# Patient Record
Sex: FEMALE | Race: WHITE | NOT HISPANIC OR LATINO | Employment: STUDENT | URBAN - METROPOLITAN AREA
[De-identification: names, ages, dates, MRNs, and addresses within clinical notes are randomized per-mention and may not be internally consistent; named-entity substitution may affect disease eponyms.]

---

## 2024-05-15 ENCOUNTER — OFFICE VISIT (OUTPATIENT)
Dept: OBSTETRICS AND GYNECOLOGY | Facility: CLINIC | Age: 24
End: 2024-05-15
Payer: MEDICAID

## 2024-05-15 VITALS
WEIGHT: 119 LBS | BODY MASS INDEX: 21.09 KG/M2 | HEIGHT: 63 IN | DIASTOLIC BLOOD PRESSURE: 62 MMHG | SYSTOLIC BLOOD PRESSURE: 110 MMHG

## 2024-05-15 DIAGNOSIS — Z87.59 HISTORY OF PRIOR PREGNANCY WITH IUGR NEWBORN: ICD-10-CM

## 2024-05-15 DIAGNOSIS — Z82.79 FAMILY HISTORY OF DOWN SYNDROME: ICD-10-CM

## 2024-05-15 DIAGNOSIS — O21.9 NAUSEA AND VOMITING DURING PREGNANCY PRIOR TO 22 WEEKS GESTATION: ICD-10-CM

## 2024-05-15 DIAGNOSIS — N92.6 MISSED MENSES: Primary | ICD-10-CM

## 2024-05-15 PROBLEM — Z34.93 PRENATAL CARE IN THIRD TRIMESTER: Status: RESOLVED | Noted: 2022-01-24 | Resolved: 2024-05-15

## 2024-05-15 PROBLEM — Z23 NEED FOR TDAP VACCINATION: Status: RESOLVED | Noted: 2022-08-07 | Resolved: 2024-05-15

## 2024-05-15 PROBLEM — Z34.90 TERM PREGNANCY: Status: RESOLVED | Noted: 2022-08-09 | Resolved: 2024-05-15

## 2024-05-15 PROBLEM — Z34.90 PREGNANCY: Status: RESOLVED | Noted: 2022-07-04 | Resolved: 2024-05-15

## 2024-05-15 PROBLEM — O99.019 MATERNAL ANEMIA IN PREGNANCY, ANTEPARTUM: Status: RESOLVED | Noted: 2022-05-31 | Resolved: 2024-05-15

## 2024-05-15 LAB
B-HCG UR QL: POSITIVE
EXPIRATION DATE: ABNORMAL
INTERNAL NEGATIVE CONTROL: ABNORMAL
INTERNAL POSITIVE CONTROL: ABNORMAL
Lab: ABNORMAL

## 2024-05-15 RX ORDER — ONDANSETRON 4 MG/1
4 TABLET, ORALLY DISINTEGRATING ORAL EVERY 8 HOURS PRN
Qty: 30 TABLET | Refills: 2 | Status: SHIPPED | OUTPATIENT
Start: 2024-05-15
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
## 2024-06-12 ENCOUNTER — INITIAL PRENATAL (OUTPATIENT)
Dept: OBSTETRICS AND GYNECOLOGY | Facility: CLINIC | Age: 24
End: 2024-06-12

## 2024-06-12 VITALS — BODY MASS INDEX: 20.37 KG/M2 | DIASTOLIC BLOOD PRESSURE: 60 MMHG | SYSTOLIC BLOOD PRESSURE: 88 MMHG | WEIGHT: 115 LBS

## 2024-06-12 DIAGNOSIS — O26.899 HEARTBURN DURING PREGNANCY, ANTEPARTUM: ICD-10-CM

## 2024-06-12 DIAGNOSIS — O21.9 NAUSEA AND VOMITING DURING PREGNANCY PRIOR TO 22 WEEKS GESTATION: ICD-10-CM

## 2024-06-12 DIAGNOSIS — Z34.00 INITIAL OBSTETRIC VISIT, ANTEPARTUM: Primary | ICD-10-CM

## 2024-06-12 DIAGNOSIS — Z87.59 HISTORY OF PRIOR PREGNANCY WITH IUGR NEWBORN: ICD-10-CM

## 2024-06-12 DIAGNOSIS — I95.9 HYPOTENSION, UNSPECIFIED HYPOTENSION TYPE: ICD-10-CM

## 2024-06-12 DIAGNOSIS — Z36.9 ENCOUNTER FOR ANTENATAL SCREENING, UNSPECIFIED: ICD-10-CM

## 2024-06-12 DIAGNOSIS — Z82.79 FAMILY HISTORY OF DOWN SYNDROME: ICD-10-CM

## 2024-06-12 DIAGNOSIS — R12 HEARTBURN DURING PREGNANCY, ANTEPARTUM: ICD-10-CM

## 2024-06-12 LAB
BILIRUB BLD-MCNC: NEGATIVE MG/DL
CLARITY, POC: CLEAR
COLOR UR: YELLOW
GLUCOSE UR STRIP-MCNC: NEGATIVE MG/DL
KETONES UR QL: NEGATIVE
LEUKOCYTE EST, POC: NEGATIVE
NITRITE UR-MCNC: NEGATIVE MG/ML
PH UR: 5 [PH] (ref 5–8)
PROT UR STRIP-MCNC: NEGATIVE MG/DL
RBC # UR STRIP: NEGATIVE /UL
SP GR UR: 1 (ref 1–1.03)
UROBILINOGEN UR QL: NORMAL

## 2024-06-12 RX ORDER — FAMOTIDINE 20 MG/1
20 TABLET, FILM COATED ORAL 2 TIMES DAILY
Qty: 60 TABLET | Refills: 2 | Status: SHIPPED | OUTPATIENT
Start: 2024-06-12 | End: 2025-06-12

## 2024-06-12 RX ORDER — ONDANSETRON 4 MG/1
4 TABLET, ORALLY DISINTEGRATING ORAL EVERY 8 HOURS PRN
Qty: 30 TABLET | Refills: 2 | Status: SHIPPED | OUTPATIENT
Start: 2024-06-12
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
## 2024-06-13 LAB
ABO GROUP BLD: NORMAL
BASOPHILS # BLD AUTO: 0.1 X10E3/UL (ref 0–0.2)
BASOPHILS NFR BLD AUTO: 0 %
BLD GP AB SCN SERPL QL: NEGATIVE
EOSINOPHIL # BLD AUTO: 0.1 X10E3/UL (ref 0–0.4)
EOSINOPHIL NFR BLD AUTO: 1 %
ERYTHROCYTE [DISTWIDTH] IN BLOOD BY AUTOMATED COUNT: 12.8 % (ref 11.7–15.4)
HBA1C MFR BLD: 5.8 % (ref 4.8–5.6)
HBV SURFACE AG SERPL QL IA: NEGATIVE
HCT VFR BLD AUTO: 40.2 % (ref 34–46.6)
HCV IGG SERPL QL IA: NON REACTIVE
HGB A MFR BLD ELPH: 97.5 % (ref 96.4–98.8)
HGB A2 MFR BLD ELPH: 2.5 % (ref 1.8–3.2)
HGB BLD-MCNC: 13.1 G/DL (ref 11.1–15.9)
HGB F MFR BLD ELPH: 0 % (ref 0–2)
HGB FRACT BLD-IMP: NORMAL
HGB S MFR BLD ELPH: 0 %
HIV 1+2 AB+HIV1 P24 AG SERPL QL IA: NON REACTIVE
IMM GRANULOCYTES # BLD AUTO: 0.1 X10E3/UL (ref 0–0.1)
IMM GRANULOCYTES NFR BLD AUTO: 1 %
LYMPHOCYTES # BLD AUTO: 2.6 X10E3/UL (ref 0.7–3.1)
LYMPHOCYTES NFR BLD AUTO: 20 %
MCH RBC QN AUTO: 28.8 PG (ref 26.6–33)
MCHC RBC AUTO-ENTMCNC: 32.6 G/DL (ref 31.5–35.7)
MCV RBC AUTO: 88 FL (ref 79–97)
MONOCYTES # BLD AUTO: 0.6 X10E3/UL (ref 0.1–0.9)
MONOCYTES NFR BLD AUTO: 5 %
NEUTROPHILS # BLD AUTO: 9.6 X10E3/UL (ref 1.4–7)
NEUTROPHILS NFR BLD AUTO: 73 %
PLATELET # BLD AUTO: 312 X10E3/UL (ref 150–450)
RBC # BLD AUTO: 4.55 X10E6/UL (ref 3.77–5.28)
RH BLD: POSITIVE
RPR SER QL: NON REACTIVE
RUBV IGG SERPL IA-ACNC: 1.89 INDEX
VZV IGG SER IA-ACNC: 2418 INDEX
WBC # BLD AUTO: 13 X10E3/UL (ref 3.4–10.8)

## 2024-06-14 LAB
AMPHETAMINES UR QL SCN: NEGATIVE NG/ML
BACTERIA UR CULT: NO GROWTH
BACTERIA UR CULT: NORMAL
BARBITURATES UR QL SCN: NEGATIVE NG/ML
BENZODIAZ UR QL SCN: NEGATIVE NG/ML
BUPRENORPHINE UR QL: NEGATIVE NG/ML
BZE UR QL SCN: NEGATIVE NG/ML
CANNABINOIDS UR QL SCN: NEGATIVE NG/ML
CREAT UR-MCNC: 110.5 MG/DL (ref 20–300)
FENTANYL UR-MCNC: NEGATIVE PG/ML
LABORATORY COMMENT REPORT: NORMAL
MEPERIDINE UR QL: NEGATIVE NG/ML
METHADONE UR QL SCN: NEGATIVE NG/ML
OPIATES UR QL SCN: NEGATIVE NG/ML
OXYCODONE+OXYMORPHONE UR QL SCN: NEGATIVE NG/ML
PCP UR QL: NEGATIVE NG/ML
PH UR: 7.3 [PH] (ref 4.5–8.9)
PROPOXYPH UR QL SCN: NEGATIVE NG/ML
TRAMADOL UR QL SCN: NEGATIVE NG/ML

## 2024-06-15 LAB
C TRACH RRNA CVX QL NAA+PROBE: NEGATIVE
CFDNA.FET/CFDNA.TOTAL SFR FETUS: ABNORMAL %
CITATION REF LAB TEST: ABNORMAL
CONV .: NORMAL
CYTOLOGIST CVX/VAG CYTO: NORMAL
CYTOLOGY CVX/VAG DOC CYTO: NORMAL
CYTOLOGY CVX/VAG DOC THIN PREP: NORMAL
DX ICD CODE: NORMAL
FET 13+18+21+X+Y ANEUP PLAS.CFDNA: ABNORMAL
GA EST FROM CONCEPTION DATE: ABNORMAL D
GESTATIONAL AGE > 9:: YES
LAB DIRECTOR NAME PROVIDER: ABNORMAL
LAB DIRECTOR NAME PROVIDER: ABNORMAL
LABORATORY COMMENT REPORT: ABNORMAL
LIMITATIONS OF THE TEST: ABNORMAL
Lab: NORMAL
N GONORRHOEA RRNA CVX QL NAA+PROBE: NEGATIVE
NEGATIVE PREDICTIVE VALUE: ABNORMAL
NOTE: ABNORMAL
OTHER STN SPEC: NORMAL
PERFORMANCE CHARACTERISTICS: ABNORMAL
REF LAB TEST METHOD: ABNORMAL
STAT OF ADQ CVX/VAG CYTO-IMP: NORMAL
T VAGINALIS RRNA SPEC QL NAA+PROBE: NEGATIVE
TEST PERFORMANCE INFO SPEC: ABNORMAL

## 2024-06-26 ENCOUNTER — LAB (OUTPATIENT)
Dept: OBSTETRICS AND GYNECOLOGY | Facility: CLINIC | Age: 24
End: 2024-06-26
Payer: MEDICAID

## 2024-06-26 ENCOUNTER — ROUTINE PRENATAL (OUTPATIENT)
Dept: OBSTETRICS AND GYNECOLOGY | Facility: CLINIC | Age: 24
End: 2024-06-26
Payer: MEDICAID

## 2024-06-26 VITALS — WEIGHT: 123.6 LBS | SYSTOLIC BLOOD PRESSURE: 110 MMHG | DIASTOLIC BLOOD PRESSURE: 64 MMHG | BODY MASS INDEX: 21.89 KG/M2

## 2024-06-26 DIAGNOSIS — O20.8 SUBCHORIONIC HEMORRHAGE IN FIRST TRIMESTER: ICD-10-CM

## 2024-06-26 DIAGNOSIS — Z36.9 ENCOUNTER FOR ANTENATAL SCREENING, UNSPECIFIED: Primary | ICD-10-CM

## 2024-06-26 DIAGNOSIS — Z34.92 PRENATAL CARE IN SECOND TRIMESTER: ICD-10-CM

## 2024-06-26 DIAGNOSIS — Z82.79 FAMILY HISTORY OF DOWN SYNDROME: ICD-10-CM

## 2024-06-26 DIAGNOSIS — Z67.21 TYPE B BLOOD, RH NEGATIVE: Primary | ICD-10-CM

## 2024-06-26 DIAGNOSIS — R73.09 ELEVATED HEMOGLOBIN A1C: ICD-10-CM

## 2024-06-26 DIAGNOSIS — Z36.9 ENCOUNTER FOR ANTENATAL SCREENING, UNSPECIFIED: ICD-10-CM

## 2024-06-26 LAB
BILIRUB BLD-MCNC: NEGATIVE MG/DL
CLARITY, POC: CLEAR
COLOR UR: YELLOW
EXTERNAL NIPT: NEGATIVE
GLUCOSE UR STRIP-MCNC: NEGATIVE MG/DL
KETONES UR QL: NEGATIVE
LEUKOCYTE EST, POC: NEGATIVE
NITRITE UR-MCNC: NEGATIVE MG/ML
PH UR: 5 [PH] (ref 5–8)
PROT UR STRIP-MCNC: NEGATIVE MG/DL
RBC # UR STRIP: NEGATIVE /UL
SP GR UR: 1 (ref 1–1.03)
UROBILINOGEN UR QL: NORMAL

## 2024-06-26 NOTE — PROGRESS NOTES
OB follow up     CC:  Here for prenatal follow up    Shun Tafoya is a 23 y.o.  12w2d being seen today for her obstetrical visit.  Patient reports  no c/o. Her nausea is improved with Zofran.   Taking +PNV.     Review of Systems  Genitourinary: Neg for cramping, vaginal bleeding, SROM, or dysuria.       /64   Wt 56.1 kg (123 lb 9.6 oz)   LMP 2024   BMI 21.89 kg/m²     FHT: 150s  BPM   Uterine Size: size equals dates   Assessment    1) Pregnancy at 12w2d     2) CF/SMA/FX neg with previous pregnancy. T21 cancelled by lab, repeat draw today.     3) Abnormal HgbA1C- Early 2hr GTT in progress today.     4)  N/V- Taking zofran      5) heartburn- Taking Pepcid BID     10) Hx of SGA in 1st pregnancy- Serial growth US starting at 28 weeks      11) Family hx of Down's- maternal cousin; repeating NIPT today     12) hypotension- BP normal today. Feels better.      13) CHRISTINE- Rh +. Denies VB. Repeat US in 2 weeks      Plan    Continue prenatal vitamins   Reviewed this stage of pregnancy  Problem list updated   Follow up in 2 weeks for US to reeval CHRISTINE    Ashlee Jones, KRISTA     2024  10:55 EDT

## 2024-06-27 LAB
GLUCOSE 1H P 75 G GLC PO SERPL-MCNC: 91 MG/DL (ref 70–179)
GLUCOSE 2H P 75 G GLC PO SERPL-MCNC: 98 MG/DL (ref 70–152)
GLUCOSE P FAST SERPL-MCNC: 84 MG/DL (ref 70–91)
HCT VFR BLD AUTO: 36.3 % (ref 34–46.6)
HGB BLD-MCNC: 12 G/DL (ref 11.1–15.9)
RPR SER QL: NON REACTIVE

## 2024-07-01 LAB
CFDNA.FET/CFDNA.TOTAL SFR FETUS: NORMAL %
CITATION REF LAB TEST: NORMAL
FET 13+18+21+X+Y ANEUP PLAS.CFDNA: NEGATIVE
FET CHR 21 TS PLAS.CFDNA QL: NEGATIVE
FET SEX PLAS.CFDNA DOSAGE CFDNA: NORMAL
FET TS 13 RISK PLAS.CFDNA QL: NEGATIVE
FET TS 18 RISK WBC.DNA+CFDNA QL: NEGATIVE
GA EST FROM CONCEPTION DATE: NORMAL D
GESTATIONAL AGE > 9:: YES
LAB DIRECTOR NAME PROVIDER: NORMAL
LAB DIRECTOR NAME PROVIDER: NORMAL
LABORATORY COMMENT REPORT: NORMAL
LIMITATIONS OF THE TEST: NORMAL
NEGATIVE PREDICTIVE VALUE: NORMAL
NOTE: NORMAL
PERFORMANCE CHARACTERISTICS: NORMAL
POSITIVE PREDICTIVE VALUE: NORMAL
REF LAB TEST METHOD: NORMAL
TEST PERFORMANCE INFO SPEC: NORMAL

## 2024-07-10 ENCOUNTER — ROUTINE PRENATAL (OUTPATIENT)
Dept: OBSTETRICS AND GYNECOLOGY | Facility: CLINIC | Age: 24
End: 2024-07-10

## 2024-07-10 VITALS — SYSTOLIC BLOOD PRESSURE: 102 MMHG | DIASTOLIC BLOOD PRESSURE: 60 MMHG | WEIGHT: 122 LBS | BODY MASS INDEX: 21.61 KG/M2

## 2024-07-10 DIAGNOSIS — Z36.9 ENCOUNTER FOR ANTENATAL SCREENING, UNSPECIFIED: Primary | ICD-10-CM

## 2024-07-10 NOTE — PROGRESS NOTES
S:  CC: Pt here for ob office visit and f/u u/s for CHRISTINE.  HPI: Shun Tafoya is a 23 y.o.  14w2d being seen today for her obstetrical visit.   Patient reports no complaints .   Fetal movement: too early. .      Social History     Social History Narrative    Not on file      SMOKER? No      O:  /60   Wt 55.3 kg (122 lb)   LMP 2024   BMI 21.61 kg/m²     Prenatal Assessment  Fetal Heart Rate: present  Movement: Absent  Prenatal Vitals  BP: 102/60  Weight: 55.3 kg (122 lb)  Vaginal Drainage  Draining Fluid: No  Edema  LLE Edema: None  RLE Edema: None  Facial Edema: None    Brief Urine Lab Results  (Last result in the past 365 days)        Color   Clarity   Blood   Leuk Est   Nitrite   Protein   CREAT   Urine HCG        24 1109 Yellow   Clear   Negative   Negative   Negative   Negative                   Results for orders placed in visit on 07/10/24     Ob Limited 1 + Fetuses    Narrative  Normal, VIUP, no CHRISTINE       A/P:    Assessment & Plan  Encounter for  screening, unspecified      Orders Placed This Encounter   Procedures    POC Urinalysis Dipstick            Return in about 4 weeks (around 2024) for ob tummy.      Pt instructed to call for results of any testing done today and that failure to call if she has not heard from us could result in inadequate care and treament.  Pt verbalized her understanding.        Henrry Claros MD  10:09 EDT   07/10/24

## 2024-08-07 ENCOUNTER — ROUTINE PRENATAL (OUTPATIENT)
Dept: OBSTETRICS AND GYNECOLOGY | Facility: CLINIC | Age: 24
End: 2024-08-07
Payer: MEDICAID

## 2024-08-07 VITALS — SYSTOLIC BLOOD PRESSURE: 98 MMHG | WEIGHT: 127 LBS | BODY MASS INDEX: 22.5 KG/M2 | DIASTOLIC BLOOD PRESSURE: 62 MMHG

## 2024-08-07 DIAGNOSIS — I95.89 OTHER SPECIFIED HYPOTENSION: ICD-10-CM

## 2024-08-07 DIAGNOSIS — Z82.79 FAMILY HISTORY OF DOWN SYNDROME: ICD-10-CM

## 2024-08-07 DIAGNOSIS — Z87.59 HISTORY OF PRIOR PREGNANCY WITH IUGR NEWBORN: ICD-10-CM

## 2024-08-07 DIAGNOSIS — Z36.9 ENCOUNTER FOR ANTENATAL SCREENING, UNSPECIFIED: ICD-10-CM

## 2024-08-07 DIAGNOSIS — Z34.92 PRENATAL CARE IN SECOND TRIMESTER: Primary | ICD-10-CM

## 2024-08-07 LAB
BILIRUB BLD-MCNC: NEGATIVE MG/DL
CLARITY, POC: CLEAR
COLOR UR: YELLOW
GLUCOSE UR STRIP-MCNC: NEGATIVE MG/DL
KETONES UR QL: NEGATIVE
LEUKOCYTE EST, POC: NEGATIVE
NITRITE UR-MCNC: NEGATIVE MG/ML
PH UR: 5 [PH] (ref 5–8)
PROT UR STRIP-MCNC: NEGATIVE MG/DL
RBC # UR STRIP: NEGATIVE /UL
SP GR UR: 1.02 (ref 1–1.03)
UROBILINOGEN UR QL: NORMAL

## 2024-08-07 NOTE — PROGRESS NOTES
OB follow up < 20 weeks    CC:  Here for prenatal follow up    Shun Tafoya is a 23 y.o.  18w2d being seen today for her obstetrical visit.  Patient reports  headaches.   Taking +PNV.  FM+.    Review of Systems  Genitourinary: Neg for cramping, vaginal bleeding, SROM, or dysuria.   BP 98/62   Wt 57.6 kg (127 lb)   LMP 2024   BMI 22.50 kg/m²     FHT: 146  BPM   Uterine Size: size equals dates   Assessment    1) Pregnancy at 18w2d     2) CF/SMA/FX neg with previous pregnancy. T21 neg. Check AFP today.    3) Abnormal HgbA1C- Early 2hr GTT passed; repeat at routine 28wga    4) N/V- Taking zofran      5) heartburn- Taking Pepcid BID     10) Hx of SGA in 1st pregnancy- Serial growth US starting at 28 weeks      11) Family hx of Down's- maternal cousin; NIPT neg    12) hypotension- slightly low today. Reports headaches; rec OTC fluid with electrolytes     13) CHRISTINE- resolved        Plan    Continue prenatal vitamins   Reviewed this stage of pregnancy  Problem list updated   Follow up in 2 weeks for OBT, US for anatomy    Parts of this document have been copied or forwarded from her previous visits and have been reviewed, updated and edited as indicated.      Chanel Pedraza, APRN     2024  12:19 EDT

## 2024-08-09 LAB
AFP INTERP SERPL-IMP: NORMAL
AFP INTERP SERPL-IMP: NORMAL
AFP MOM SERPL: 1.68
AFP SERPL-MCNC: 94 NG/ML
AGE AT DELIVERY: 23.1 YR
GA METHOD: NORMAL
GA: 18.2 WEEKS
IDDM PATIENT QL: NO
LABORATORY COMMENT REPORT: NORMAL
MULTIPLE PREGNANCY: NO
NEURAL TUBE DEFECT RISK FETUS: 3427 %
RESULT: NORMAL

## 2024-08-21 ENCOUNTER — ROUTINE PRENATAL (OUTPATIENT)
Dept: OBSTETRICS AND GYNECOLOGY | Facility: CLINIC | Age: 24
End: 2024-08-21
Payer: MEDICAID

## 2024-08-21 VITALS — WEIGHT: 130.8 LBS | DIASTOLIC BLOOD PRESSURE: 64 MMHG | SYSTOLIC BLOOD PRESSURE: 110 MMHG | BODY MASS INDEX: 23.17 KG/M2

## 2024-08-21 DIAGNOSIS — Z34.92 PRENATAL CARE IN SECOND TRIMESTER: Primary | ICD-10-CM

## 2024-08-21 DIAGNOSIS — O20.8 SUBCHORIONIC HEMORRHAGE IN FIRST TRIMESTER: ICD-10-CM

## 2024-08-21 DIAGNOSIS — I95.89 OTHER SPECIFIED HYPOTENSION: ICD-10-CM

## 2024-08-21 DIAGNOSIS — R12 HEARTBURN DURING PREGNANCY, ANTEPARTUM: ICD-10-CM

## 2024-08-21 DIAGNOSIS — O44.02 COMPLETE PLACENTA PREVIA NOS OR WITHOUT HEMORRHAGE, SECOND TRIMESTER: ICD-10-CM

## 2024-08-21 DIAGNOSIS — Z36.9 ENCOUNTER FOR ANTENATAL SCREENING, UNSPECIFIED: ICD-10-CM

## 2024-08-21 DIAGNOSIS — R73.09 ELEVATED HEMOGLOBIN A1C: ICD-10-CM

## 2024-08-21 DIAGNOSIS — Z87.59 HISTORY OF PRIOR PREGNANCY WITH IUGR NEWBORN: ICD-10-CM

## 2024-08-21 DIAGNOSIS — Z82.79 FAMILY HISTORY OF DOWN SYNDROME: ICD-10-CM

## 2024-08-21 DIAGNOSIS — O26.899 HEARTBURN DURING PREGNANCY, ANTEPARTUM: ICD-10-CM

## 2024-08-21 PROBLEM — O44.12 COMPLETE PLACENTA PREVIA WITH HEMORRHAGE, SECOND TRIMESTER: Status: ACTIVE | Noted: 2024-08-21

## 2024-08-21 NOTE — PROGRESS NOTES
Chief Complaint   Patient presents with    Routine Prenatal Visit       HPI: 23 y.o.  at 20w2d here for routine OB visit.  Anatomy scan today.  Feeling much better.  FM+.  Here with significant other.  Denies VB.    Relevant data reviewed: US IMP- fetal anatomy complete and appears WNL. VTX. FHR 147bpm. Post placenta. Cx khadijah 4.67cm. MVP 3.86cm. Complete previa.     Last OB US growth (since 4/3/2024)       None          Vitals:    24 1029   BP: 110/64   Weight: 59.3 kg (130 lb 12.8 oz)     Total weight gain for pregnancy:  Not found.    Cx exam:   / /        Review of systems:   Gen: negative  CV:     negative  GI: negative  :   negative and good fetal movement noted   MS:    negative  Neuro: negative  Pul: negative    Physical Exam  Vitals reviewed.   Constitutional:       General: She is not in acute distress.  HENT:      Head: Normocephalic and atraumatic.   Pulmonary:      Effort: Pulmonary effort is normal. No respiratory distress.   Musculoskeletal:      Cervical back: Neck supple. No rigidity.   Neurological:      Mental Status: She is alert and oriented to person, place, and time.         A/P  1. Intrauterine pregnancy at 20w2d   2. Pregnancy Risk:  COMPLICATED- complete previa    Diagnoses and all orders for this visit:    1. Prenatal care in second trimester (Primary)    2. Encounter for  screening, unspecified  Overview:  CF/SMA/FX neg with previous pregnancy. T21 neg. AFP neg.     Orders:  -     POC Urinalysis Dipstick    3. Elevated hemoglobin A1c  Overview:  Early 2hr GTT passed; repeat at routine 28wga       4. Family history of Down syndrome- maternal cousin  Overview:  NIPT neg       5. History of prior pregnancy with SGA: Growth 16%, AC< 2% @ 39 weeks   Overview:  Serial growth US starting at 28 weeks       6. Other specified hypotension  Overview:  BP normotensive; feels much better today      7. Subchorionic hemorrhage in first trimester- resolved    8. Complete placenta  previa nos or without hemorrhage, second trimester  Overview:  Noted on US today.  Denies VB. Discussed pelvic restrictions.  Repeat US at 24wga      9. Heartburn during pregnancy, antepartum  Overview:  Taking Pepcid BID            labor was discussed.  Warnings were provided.  Nutrition and weight gain were addressed.  Practice OB call structure was discussed.   Reviewed this stage of pregnancy  Problem list updated  -----------------------  PLAN:   Return in about 4 weeks (around 2024) for OB Tummy, repeat US- complete previa.    Chanel Pedraza, APRN  2024 10:57 EDT

## 2024-09-07 DIAGNOSIS — O26.899 HEARTBURN DURING PREGNANCY, ANTEPARTUM: ICD-10-CM

## 2024-09-07 DIAGNOSIS — R12 HEARTBURN DURING PREGNANCY, ANTEPARTUM: ICD-10-CM

## 2024-09-13 RX ORDER — FAMOTIDINE 20 MG/1
20 TABLET, FILM COATED ORAL 2 TIMES DAILY
Qty: 180 TABLET | Refills: 0 | Status: SHIPPED | OUTPATIENT
Start: 2024-09-13

## 2024-09-18 ENCOUNTER — ROUTINE PRENATAL (OUTPATIENT)
Dept: OBSTETRICS AND GYNECOLOGY | Facility: CLINIC | Age: 24
End: 2024-09-18
Payer: MEDICAID

## 2024-09-18 VITALS — SYSTOLIC BLOOD PRESSURE: 118 MMHG | BODY MASS INDEX: 24.09 KG/M2 | WEIGHT: 136 LBS | DIASTOLIC BLOOD PRESSURE: 74 MMHG

## 2024-09-18 DIAGNOSIS — Z36.9 ENCOUNTER FOR ANTENATAL SCREENING, UNSPECIFIED: ICD-10-CM

## 2024-09-18 DIAGNOSIS — Z82.79 FAMILY HISTORY OF DOWN SYNDROME: ICD-10-CM

## 2024-09-18 DIAGNOSIS — Z34.92 PRENATAL CARE IN SECOND TRIMESTER: Primary | ICD-10-CM

## 2024-09-18 DIAGNOSIS — O44.12 COMPLETE PLACENTA PREVIA WITH HEMORRHAGE, SECOND TRIMESTER: ICD-10-CM

## 2024-10-07 ENCOUNTER — ROUTINE PRENATAL (OUTPATIENT)
Dept: OBSTETRICS AND GYNECOLOGY | Facility: CLINIC | Age: 24
End: 2024-10-07
Payer: MEDICAID

## 2024-10-07 VITALS — SYSTOLIC BLOOD PRESSURE: 102 MMHG | DIASTOLIC BLOOD PRESSURE: 60 MMHG | BODY MASS INDEX: 25.97 KG/M2 | WEIGHT: 142 LBS

## 2024-10-07 DIAGNOSIS — R12 HEARTBURN DURING PREGNANCY, ANTEPARTUM: ICD-10-CM

## 2024-10-07 DIAGNOSIS — Z87.59 HISTORY OF PRIOR PREGNANCY WITH IUGR NEWBORN: ICD-10-CM

## 2024-10-07 DIAGNOSIS — O26.899 HEARTBURN DURING PREGNANCY, ANTEPARTUM: ICD-10-CM

## 2024-10-07 DIAGNOSIS — Z34.93 PRENATAL CARE IN THIRD TRIMESTER: Primary | ICD-10-CM

## 2024-10-07 DIAGNOSIS — R73.09 ELEVATED HEMOGLOBIN A1C: ICD-10-CM

## 2024-10-07 DIAGNOSIS — Z82.79 FAMILY HISTORY OF DOWN SYNDROME: ICD-10-CM

## 2024-10-07 DIAGNOSIS — Z36.9 ENCOUNTER FOR ANTENATAL SCREENING, UNSPECIFIED: ICD-10-CM

## 2024-10-07 DIAGNOSIS — O44.12 COMPLETE PLACENTA PREVIA WITH HEMORRHAGE, SECOND TRIMESTER: ICD-10-CM

## 2024-10-07 PROCEDURE — 99213 OFFICE O/P EST LOW 20 MIN: CPT | Performed by: NURSE PRACTITIONER

## 2024-10-07 PROCEDURE — 90472 IMMUNIZATION ADMIN EACH ADD: CPT | Performed by: NURSE PRACTITIONER

## 2024-10-07 PROCEDURE — 90471 IMMUNIZATION ADMIN: CPT | Performed by: NURSE PRACTITIONER

## 2024-10-07 PROCEDURE — 90715 TDAP VACCINE 7 YRS/> IM: CPT | Performed by: NURSE PRACTITIONER

## 2024-10-07 PROCEDURE — 90656 IIV3 VACC NO PRSV 0.5 ML IM: CPT | Performed by: NURSE PRACTITIONER

## 2024-10-07 NOTE — PROGRESS NOTES
OB follow up > 20 weeks    Chief Complaint   Patient presents with    Routine Prenatal Visit       Shun Tafoya is a 23 y.o.  27w0d being seen today for her obstetrical visit.  Patient reports no complaints. Taking prenatal vitamins: Yes      Review of Systems  Genitourinary: Negative for contractions, cramping, vaginal bleeding, or SROM.   Fetal movement: normal  No Known Allergies     /60   Wt 64.4 kg (142 lb)   LMP 2024   BMI 25.97 kg/m²     FHT: 130 BPM   Uterine Size: 26 cm       Assessment    1) pregnancy at 27w0d     2) CF/SMA/FX neg with previous pregnancy. T21 neg.  AFP neg     3) Abnormal HgbA1C- Early 2hr GTT passed; repeat at routine 28wga     4) N/V- Taking zofran PRN     5) heartburn- Taking Pepcid BID     6) Hx of SGA in 1st pregnancy- Serial growth US starting at 28 weeks    24wga (57%), 28wga (), 32wga (), 36wga ()    7) Family hx of Down's- maternal cousin; NIPT neg     8) Complete previa- Resolved. Restrictions lifted.      9) RSV vaccine info provided      10) Disc that all pregnant women should get a Tdap shot in the third trimester, preferably between 27 weeks and 36 weeks of pregnancy. The Tdap shot is an effective and safe way to protect the baby from serious illness and complications of pertussis. Recommend that partners, family members, and infant caregivers should be up to date on theTdap vaccine if they have not previously been vaccinated. Ideally, all family members should be vaccinated at least 2 weeks before coming in contact with the . If not administered during pregnancy, the Tdap vaccine should be given immediately postpartum if the patient is not UTD on Tdap.   Received Tdap vaccine today.    11) Received flu vaccine    Plan    Continue prenatal vitamins  Reviewed this stage of pregnancy  Problem list updated   Follow up in 2 weeks for OBT, US-growth    Parts of this document have been copied or forwarded from her previous visits and have been  reviewed, updated and edited as indicated.      Chanel Pedraza, APRN  10/7/2024  10:31 EDT

## 2024-10-08 DIAGNOSIS — O99.019 MATERNAL ANEMIA IN PREGNANCY, ANTEPARTUM: Primary | ICD-10-CM

## 2024-10-08 RX ORDER — FERROUS GLUCONATE 324(38)MG
324 TABLET ORAL
Qty: 100 TABLET | Refills: 2 | Status: SHIPPED | OUTPATIENT
Start: 2024-10-08

## 2024-10-22 ENCOUNTER — ROUTINE PRENATAL (OUTPATIENT)
Dept: OBSTETRICS AND GYNECOLOGY | Facility: CLINIC | Age: 24
End: 2024-10-22
Payer: MEDICAID

## 2024-10-22 VITALS — SYSTOLIC BLOOD PRESSURE: 108 MMHG | DIASTOLIC BLOOD PRESSURE: 64 MMHG | WEIGHT: 143 LBS | BODY MASS INDEX: 26.16 KG/M2

## 2024-10-22 DIAGNOSIS — Z36.9 ENCOUNTER FOR ANTENATAL SCREENING, UNSPECIFIED: Primary | ICD-10-CM

## 2024-10-22 NOTE — PROGRESS NOTES
OB follow up > 20 weeks    Chief Complaint   Patient presents with    Routine Prenatal Visit       Shun Tafoya is a 23 y.o.  29+ being seen today for her obstetrical visit.  Patient reports some LBP. Taking prenatal vitamins: Yes      Review of Systems  Genitourinary: Negative for contractions, cramping, vaginal bleeding, or SROM.   Fetal movement: normal  No Known Allergies     /64   Wt 64.9 kg (143 lb)   LMP 2024   BMI 26.16 kg/m²     Vitals: VSS; AF    General Appearance:  Awake. Alert. Well developed. Well nourished. In no acute distress.    Visual Inspection: ° Abdomen was normal on visual inspection.  Palpation: ° Abdomen was soft. ° Abdominal non-tender.    Uterus: ° Fundal height was normal for gestational age. ° Not tender.  Uterine Adnexae: ° Normal without masses or tenderness.  Neurological:  ° Oriented to time, place, and person.  Skin:  ° General appearance was normal. No bruising or ecchymosis.  Obstetrical: +FM and FCA     Presentation: VTX  Placenta: Posterior  HAYDEN: 14 cm   FCA: 130's    EFW  1441g 3.3# 60%         Ultrasound images and report reviewed personally by me.    Full interpretation of ultrasound can be found in the patient's note on the same date of service.     Raúl Poe, DO     Assessment    1) pregnancy at 29+1  2hr neg   Anemia noted on Fe+     2) CF/SMA/FX neg with previous pregnancy. T21 neg.  AFP neg     3) Abnormal HgbA1C- Early 2hr GTT passed; repeat at routine 28wga     4) N/V- Taking zofran PRN     5) heartburn- Taking Pepcid BID     6) Hx of SGA in 1st pregnancy- Serial growth US starting at 28 weeks    24wga (57%), 28wga (60%),   Growth @ 32wga (), 36wga ()    7) Family hx of Down's- maternal cousin; NIPT neg     8) Complete previa- Resolved. Restrictions lifted.      9) RSV vaccine info provided      10)   Received Tdap and flu vaccine     11) Anemia noted  Re-check 32 wga ( )     Plan    Continue prenatal vitamins  Reviewed this stage of  pregnancy  Problem list updated   Follow up in 3 weeks for OB, CBC, RSV vaccine     Parts of this document have been copied or forwarded from her previous visits and have been reviewed, updated and edited as indicated.      Raúl Poe DO  10/22/2024  11:31 EDT

## 2024-11-05 ENCOUNTER — ROUTINE PRENATAL (OUTPATIENT)
Dept: OBSTETRICS AND GYNECOLOGY | Facility: CLINIC | Age: 24
End: 2024-11-05
Payer: MEDICAID

## 2024-11-05 VITALS — DIASTOLIC BLOOD PRESSURE: 70 MMHG | BODY MASS INDEX: 26.52 KG/M2 | WEIGHT: 145 LBS | SYSTOLIC BLOOD PRESSURE: 102 MMHG

## 2024-11-05 DIAGNOSIS — Z87.59 HISTORY OF PRIOR PREGNANCY WITH IUGR NEWBORN: ICD-10-CM

## 2024-11-05 DIAGNOSIS — O26.893 HEARTBURN DURING PREGNANCY IN THIRD TRIMESTER: ICD-10-CM

## 2024-11-05 DIAGNOSIS — Z36.9 ENCOUNTER FOR ANTENATAL SCREENING, UNSPECIFIED: ICD-10-CM

## 2024-11-05 DIAGNOSIS — R12 HEARTBURN DURING PREGNANCY IN THIRD TRIMESTER: ICD-10-CM

## 2024-11-05 DIAGNOSIS — Z34.93 PRENATAL CARE IN THIRD TRIMESTER: Primary | ICD-10-CM

## 2024-11-05 NOTE — PROGRESS NOTES
OB follow up     Shun Tafoya is a 23 y.o.  31w1d being seen today for her obstetrical visit.  Patient reports no bleeding, no contractions, and no leaking. Fetal movement: normal.  Prenatal care complicated by GERD.  Takes Pepcid 20 mg twice daily with good relief.  Symptoms are stable.  Had a history of an SGA baby.  Last ultrasound 2 weeks ago showed growth in the 60th percentile.    Review of Systems  No bleeding, No cramping/contractions     /70   Wt 65.8 kg (145 lb)   LMP 2024   BMI 26.52 kg/m²     FHT:   BPM   Uterine Size:         Assessment/Plan:    1) 23 y.o.  -pregnancy at 31w1d    2)   Encounter Diagnoses   Name Primary?    Prenatal care in third trimester Yes    Encounter for  screening, unspecified     Heartburn during pregnancy in third trimester     History of prior pregnancy with SGA: Growth 16%, AC< 2% @ 39 weeks     Repeat ultrasound every 4 weeks.  Ultrasound at next visit for growth due to history of SGA and a previous pregnancy.  Continue Pepcid daily.    3) Reviewed this stage of pregnancy  4) Problem list updated     Return in about 2 weeks (around 2024) for US, Growth (Hx SGA), OB Tummy.    I spent 20 minutes caring for Shun on this date of service. This time includes time spent by me in the following activities: preparing for the visit, reviewing tests, performing a medically appropriate examination and/or evaluation, counseling and educating the patient/family/caregiver, documenting information in the medical record, and ordering test(s).      Xavier Jean MD    2024  16:24 EST

## 2024-11-18 ENCOUNTER — ROUTINE PRENATAL (OUTPATIENT)
Dept: OBSTETRICS AND GYNECOLOGY | Facility: CLINIC | Age: 24
End: 2024-11-18
Payer: MEDICAID

## 2024-11-18 VITALS — SYSTOLIC BLOOD PRESSURE: 110 MMHG | BODY MASS INDEX: 26.89 KG/M2 | DIASTOLIC BLOOD PRESSURE: 62 MMHG | WEIGHT: 147 LBS

## 2024-11-18 DIAGNOSIS — Z36.9 ENCOUNTER FOR ANTENATAL SCREENING, UNSPECIFIED: ICD-10-CM

## 2024-11-18 DIAGNOSIS — D50.9 IRON DEFICIENCY ANEMIA, UNSPECIFIED IRON DEFICIENCY ANEMIA TYPE: ICD-10-CM

## 2024-11-18 DIAGNOSIS — Z34.93 PRENATAL CARE IN THIRD TRIMESTER: Primary | ICD-10-CM

## 2024-11-18 LAB
BILIRUB BLD-MCNC: NEGATIVE MG/DL
CLARITY, POC: CLEAR
COLOR UR: YELLOW
DEPRECATED FRAXE GENE CGG RPT BLD/T QL: NORMAL
GLUCOSE UR STRIP-MCNC: NEGATIVE MG/DL
KETONES UR QL: NEGATIVE
LEUKOCYTE EST, POC: NEGATIVE
NITRITE UR-MCNC: NEGATIVE MG/ML
NTRA CYSTIC FIBROSIS: NORMAL
NTRA SPINAL MUSCULAR ATROPHY: NORMAL
PH UR: 5 [PH] (ref 5–8)
PROT UR STRIP-MCNC: NEGATIVE MG/DL
RBC # UR STRIP: NEGATIVE /UL
SP GR UR: 1 (ref 1–1.03)
UROBILINOGEN UR QL: NORMAL

## 2024-11-18 NOTE — PROGRESS NOTES
OB follow up > 20 weeks    Chief Complaint   Patient presents with    Routine Prenatal Visit       Shun Tafoya is a 24 y.o.  33w0d being seen today for her obstetrical visit.  Patient reports no complaints. Taking prenatal vitamins: Yes. She had a growth US today.       Review of Systems  Genitourinary: Negative for contractions, cramping, vaginal bleeding, or SROM.   Fetal movement: normal  No Known Allergies     /62   Wt 66.7 kg (147 lb)   LMP 2024   BMI 26.89 kg/m²     FHT: 140s  BPM   Uterine Size: Growth 52%        Assessment    1) pregnancy at 33w0d- US today shows Growth 52%. HAYDEN 12cm.  bpm.     2) CF/SMA/FX neg with previous pregnancy. T21 neg.  AFP neg     3) Abnormal HgbA1C- Early 2hr GTT and 28 week screening passed;      4) N/V- Taking zofran PRN     5) heartburn- Taking Pepcid BID     6) Hx of SGA in 1st pregnancy- Serial growth US starting at 28 weeks    24wga (57%), 28wga (60%),   Growth @ 32wga (), 36wga ()     7) Family hx of Down's- maternal cousin; NIPT neg     8) Complete previa- Resolved. Restrictions lifted.      9)   Vaccines:   S/P tDap   S/P Flu   RSV given today.      11) Anemia noted- Taking iron.   Re-check 32 wga ( )     Plan    Continue prenatal vitamins  Reviewed this stage of pregnancy  Problem list updated   Follow up in 2 weeks    Ashlee Jones, KRISTA  2024  15:44 EST  
RSV  
rollator/independent/needs device

## 2024-11-19 LAB
BASOPHILS # BLD AUTO: 0.05 10*3/MM3 (ref 0–0.2)
BASOPHILS NFR BLD AUTO: 0.3 % (ref 0–1.5)
EOSINOPHIL # BLD AUTO: 0.27 10*3/MM3 (ref 0–0.4)
EOSINOPHIL NFR BLD AUTO: 1.5 % (ref 0.3–6.2)
ERYTHROCYTE [DISTWIDTH] IN BLOOD BY AUTOMATED COUNT: 13.1 % (ref 12.3–15.4)
HCT VFR BLD AUTO: 32.3 % (ref 34–46.6)
HGB BLD-MCNC: 11 G/DL (ref 12–15.9)
IMM GRANULOCYTES # BLD AUTO: 0.2 10*3/MM3 (ref 0–0.05)
IMM GRANULOCYTES NFR BLD AUTO: 1.1 % (ref 0–0.5)
LYMPHOCYTES # BLD AUTO: 2.75 10*3/MM3 (ref 0.7–3.1)
LYMPHOCYTES NFR BLD AUTO: 15.5 % (ref 19.6–45.3)
MCH RBC QN AUTO: 29.5 PG (ref 26.6–33)
MCHC RBC AUTO-ENTMCNC: 34.1 G/DL (ref 31.5–35.7)
MCV RBC AUTO: 86.6 FL (ref 79–97)
MONOCYTES # BLD AUTO: 1.44 10*3/MM3 (ref 0.1–0.9)
MONOCYTES NFR BLD AUTO: 8.1 % (ref 5–12)
NEUTROPHILS # BLD AUTO: 13.04 10*3/MM3 (ref 1.7–7)
NEUTROPHILS NFR BLD AUTO: 73.5 % (ref 42.7–76)
NRBC BLD AUTO-RTO: 0 /100 WBC (ref 0–0.2)
PLATELET # BLD AUTO: 285 10*3/MM3 (ref 140–450)
RBC # BLD AUTO: 3.73 10*6/MM3 (ref 3.77–5.28)
WBC # BLD AUTO: 17.75 10*3/MM3 (ref 3.4–10.8)

## 2024-12-06 ENCOUNTER — ROUTINE PRENATAL (OUTPATIENT)
Dept: OBSTETRICS AND GYNECOLOGY | Facility: CLINIC | Age: 24
End: 2024-12-06
Payer: MEDICAID

## 2024-12-06 VITALS — BODY MASS INDEX: 27.8 KG/M2 | WEIGHT: 152 LBS | SYSTOLIC BLOOD PRESSURE: 108 MMHG | DIASTOLIC BLOOD PRESSURE: 68 MMHG

## 2024-12-06 DIAGNOSIS — Z36.9 ENCOUNTER FOR ANTENATAL SCREENING, UNSPECIFIED: Primary | ICD-10-CM

## 2024-12-06 DIAGNOSIS — O99.019 MATERNAL ANEMIA IN PREGNANCY, ANTEPARTUM: ICD-10-CM

## 2024-12-06 DIAGNOSIS — O20.8 SUBCHORIONIC HEMORRHAGE IN FIRST TRIMESTER: ICD-10-CM

## 2024-12-06 DIAGNOSIS — Z87.59 HISTORY OF PRIOR PREGNANCY WITH IUGR NEWBORN: ICD-10-CM

## 2024-12-06 DIAGNOSIS — Z82.79 FAMILY HISTORY OF DOWN SYNDROME: ICD-10-CM

## 2024-12-06 DIAGNOSIS — Z34.93 PRENATAL CARE IN THIRD TRIMESTER: ICD-10-CM

## 2024-12-06 PROBLEM — I95.9 HYPOTENSION: Status: RESOLVED | Noted: 2024-06-12 | Resolved: 2024-12-06

## 2024-12-06 PROBLEM — O44.12 COMPLETE PLACENTA PREVIA WITH HEMORRHAGE, SECOND TRIMESTER: Status: RESOLVED | Noted: 2024-08-21 | Resolved: 2024-12-06

## 2024-12-06 LAB
BILIRUB BLD-MCNC: NEGATIVE MG/DL
CLARITY, POC: CLEAR
COLOR UR: YELLOW
GLUCOSE UR STRIP-MCNC: NEGATIVE MG/DL
KETONES UR QL: NEGATIVE
LEUKOCYTE EST, POC: ABNORMAL
NITRITE UR-MCNC: NEGATIVE MG/ML
PH UR: 5 [PH] (ref 5–8)
PROT UR STRIP-MCNC: NEGATIVE MG/DL
RBC # UR STRIP: NEGATIVE /UL
SP GR UR: 1 (ref 1–1.03)
UROBILINOGEN UR QL: NORMAL

## 2024-12-06 NOTE — PROGRESS NOTES
OB follow up > 20 weeks    Chief Complaint   Patient presents with    Routine Prenatal Visit       Shun Tafoya is a 24 y.o.  35+4 being seen today for her obstetrical visit.  Patient reports no complaints. Taking prenatal vitamins: Yes. She had a growth US today.       Review of Systems  Genitourinary: Negative for contractions, cramping, vaginal bleeding, or SROM.   Fetal movement: normal  No Known Allergies     Wt 68.9 kg (152 lb)   LMP 2024   BMI 27.80 kg/m²     Vitals: VSS; AF    General Appearance:  Awake. Alert. Well developed. Well nourished. In no acute distress.    Visual Inspection: ° Abdomen was normal on visual inspection.  Palpation: ° Abdomen was soft. ° Abdominal non-tender.    Uterus: ° Fundal height was normal for gestational age. ° Not tender.  Uterine Adnexae: ° Normal without masses or tenderness.  Neurological:  ° Oriented to time, place, and person.  Skin:  ° General appearance was normal. No bruising or ecchymosis.  Obstetrical    Presentation: VTX  Placenta: Posterior  HAYDEN: 12.3 cm  FCA: 160's     Ultrasound images and report reviewed personally by me.          Raúl Poe, DO     Assessment    1) pregnancy at 35+  Growth appropriate  GBS pending     2) CF/SMA/FX neg with previous pregnancy. T21 neg.  AFP neg     3) Abnormal HgbA1C- Early 2hr GTT and 28 week screening passed;      4) N/V- Taking zofran PRN     5) heartburn- Taking Pepcid BID     6) Hx of SGA in 1st pregnancy- Serial growth US starting at 28 weeks    24wga (57%), 28wga (60%),   Growth @ 32wga 52% 4.13# 2184g   , 36wga EFW  2776g 6.2# 52%     7) Family hx of Down's- maternal cousin; NIPT neg     8) Complete previa- Resolved. Restrictions lifted.      9)   Vaccines:   S/P tDap   S/P Flu   RSV received       11) Anemia noted- Taking iron.   Re-check 32 wga 11.0 responding to Fe+     Plan    Continue prenatal vitamins  Reviewed this stage of pregnancy  Problem list updated   Follow up in 1 weeks, OB , BUDDY  next week   May desire IOL 39-40wga     Raúl Poe, DO  12/6/2024  09:37 EST

## 2024-12-10 LAB — B-HEM STREP SPEC QL CULT: NEGATIVE

## 2024-12-13 ENCOUNTER — ROUTINE PRENATAL (OUTPATIENT)
Dept: OBSTETRICS AND GYNECOLOGY | Facility: CLINIC | Age: 24
End: 2024-12-13
Payer: MEDICAID

## 2024-12-13 VITALS — BODY MASS INDEX: 27.44 KG/M2 | SYSTOLIC BLOOD PRESSURE: 110 MMHG | WEIGHT: 150 LBS | DIASTOLIC BLOOD PRESSURE: 62 MMHG

## 2024-12-13 DIAGNOSIS — Z36.9 ENCOUNTER FOR ANTENATAL SCREENING, UNSPECIFIED: ICD-10-CM

## 2024-12-13 DIAGNOSIS — O26.893 HEARTBURN DURING PREGNANCY IN THIRD TRIMESTER: ICD-10-CM

## 2024-12-13 DIAGNOSIS — Z34.93 PRENATAL CARE IN THIRD TRIMESTER: Primary | ICD-10-CM

## 2024-12-13 DIAGNOSIS — O99.019 MATERNAL ANEMIA IN PREGNANCY, ANTEPARTUM: ICD-10-CM

## 2024-12-13 DIAGNOSIS — R12 HEARTBURN DURING PREGNANCY IN THIRD TRIMESTER: ICD-10-CM

## 2024-12-13 NOTE — PROGRESS NOTES
OB follow up     Shun Tafoya is a 24 y.o.  36w4d being seen today for her obstetrical visit.  Patient reports no bleeding, no leaking, and occasional contractions. Fetal movement: normal.  Prenatal care complicated by anemia in pregnancy.  Last hemoglobin 12.  Takes iron daily.  She also has GERD and stable on Pepcid.    Review of Systems  No bleeding, No cramping/contractions     /62   Wt 68 kg (150 lb)   LMP 2024   BMI 27.44 kg/m²     FHT: present BPM   Uterine Size:     GBS was negative last visit.    Assessment/Plan:    1) 24 y.o.  -pregnancy at 36w4d    2)   Encounter Diagnoses   Name Primary?    Prenatal care in third trimester Yes    Encounter for  screening, unspecified     Maternal anemia in pregnancy, antepartum     Heartburn during pregnancy in third trimester    Continue iron and Pepcid daily.  Labor warnings given.    3) Reviewed this stage of pregnancy  4) Problem list updated     Return in about 1 week (around 2024) for OB INT.    I spent 10 minutes caring for Shun on this date of service. This time includes time spent by me in the following activities: preparing for the visit, reviewing tests, performing a medically appropriate examination and/or evaluation, counseling and educating the patient/family/caregiver, and documenting information in the medical record.      Xavier Jean MD    2024  11:47 EST

## 2024-12-20 ENCOUNTER — ROUTINE PRENATAL (OUTPATIENT)
Dept: OBSTETRICS AND GYNECOLOGY | Facility: CLINIC | Age: 24
End: 2024-12-20
Payer: MEDICAID

## 2024-12-20 VITALS — WEIGHT: 151 LBS | BODY MASS INDEX: 27.62 KG/M2 | SYSTOLIC BLOOD PRESSURE: 114 MMHG | DIASTOLIC BLOOD PRESSURE: 76 MMHG

## 2024-12-20 DIAGNOSIS — R51.9 FREQUENT HEADACHES: ICD-10-CM

## 2024-12-20 DIAGNOSIS — Z87.59 HISTORY OF PRIOR PREGNANCY WITH IUGR NEWBORN: ICD-10-CM

## 2024-12-20 DIAGNOSIS — Z3A.37 37 WEEKS GESTATION OF PREGNANCY: Primary | ICD-10-CM

## 2024-12-20 DIAGNOSIS — Z36.9 ENCOUNTER FOR ANTENATAL SCREENING, UNSPECIFIED: ICD-10-CM

## 2024-12-20 LAB
BILIRUB BLD-MCNC: NEGATIVE MG/DL
CLARITY, POC: CLEAR
COLOR UR: YELLOW
GLUCOSE UR STRIP-MCNC: NEGATIVE MG/DL
KETONES UR QL: NEGATIVE
LEUKOCYTE EST, POC: NEGATIVE
NITRITE UR-MCNC: NEGATIVE MG/ML
PH UR: 5 [PH] (ref 5–8)
PROT UR STRIP-MCNC: ABNORMAL MG/DL
RBC # UR STRIP: NEGATIVE /UL
SP GR UR: 1.01 (ref 1–1.03)
UROBILINOGEN UR QL: NORMAL

## 2024-12-20 RX ORDER — MAGNESIUM OXIDE 400 MG/1
400 TABLET ORAL DAILY
Qty: 30 TABLET | Refills: 1 | Status: SHIPPED | OUTPATIENT
Start: 2024-12-20

## 2024-12-20 NOTE — PROGRESS NOTES
Chief Complaint   Patient presents with    Routine Prenatal Visit       HPI: 24 y.o.  at 37w4d presenting for an OB visit. Overall feeling well today.  Had a stretch of regular contractions overnight that stopped around 9 AM this morning.  At their most frequently occurring every 5 minutes.  Also having persistent headaches throughout pregnancy and some more recent blurry vision.  Takes Tylenol only to relieve overnight but are more troublesome to manage during the day.  She denies any fevers, chills, chest pain, shortness of breath abdominal pain, dysuria, or leg swelling.  She denies any vaginal bleeding, leakage of fluid, change in fetal movement, or increased vaginal pressure.      Relevant data reviewed:    Last OB US Data (since 6/3/2024)       None          Vitals:    24 1149   BP: 114/76   Weight: 68.5 kg (151 lb)     Total weight gain for pregnancy:  16.3 kg (36 lb)    Cx exam:  30/3/-3       Review of systems:   Gen: negative  CV:     negative  GI: negative  :   negative  MS:    negative  Neuro: negative  Pul: negative    Physical Exam  Constitutional:       General: She is not in acute distress.     Appearance: She is not ill-appearing.   Eyes:      Pupils: Pupils are equal, round, and reactive to light.   Pulmonary:      Effort: Pulmonary effort is normal. No respiratory distress.   Abdominal:      General: There is no distension.      Palpations: Abdomen is soft.      Tenderness: There is no abdominal tenderness.   Musculoskeletal:         General: Normal range of motion.   Neurological:      General: No focal deficit present.      Mental Status: She is alert and oriented to person, place, and time.   Psychiatric:         Mood and Affect: Mood normal.         Behavior: Behavior normal.         A/P  1. Intrauterine pregnancy at 37w4d   - discussed 22s1w-25j4x IOL, patient to consider/potentially schedule next week  - GBS negative    2. Pregnancy Risk:  NORMAL    Diagnoses and all orders  for this visit:    1. 37 weeks gestation of pregnancy (Primary)    2. History of prior pregnancy with SGA: Growth 16%, AC< 2% @ 39 weeks   Overview:  Serial growth US starting at 28 weeks   24: 35 weeks: EFW  2776g 6.2# 52%      3. Encounter for  screening, unspecified  Overview:  CF/SMA/FX neg with previous pregnancy. T21 neg. AFP neg.     Orders:  -     POC Urinalysis Dipstick    4. Frequent headaches  -     magnesium oxide (MAG-OX) 400 MG tablet; Take 1 tablet by mouth Daily.  Dispense: 30 tablet; Refill: 1        Routine labor warnings were discussed and indications for L & D f/u including bleeding, regular contractions, decreased fetal movement or/and rupture of membranes.   -----------------------  PLAN:   Return in about 1 week (around 2024) for OB visit.    Joshua Quintana MD  2024   12:18 EST

## 2024-12-26 ENCOUNTER — ROUTINE PRENATAL (OUTPATIENT)
Dept: OBSTETRICS AND GYNECOLOGY | Facility: CLINIC | Age: 24
End: 2024-12-26
Payer: MEDICAID

## 2024-12-26 VITALS — SYSTOLIC BLOOD PRESSURE: 134 MMHG | BODY MASS INDEX: 28.72 KG/M2 | DIASTOLIC BLOOD PRESSURE: 88 MMHG | WEIGHT: 157 LBS

## 2024-12-26 DIAGNOSIS — R73.09 ELEVATED HEMOGLOBIN A1C: ICD-10-CM

## 2024-12-26 DIAGNOSIS — R12 HEARTBURN DURING PREGNANCY, ANTEPARTUM: ICD-10-CM

## 2024-12-26 DIAGNOSIS — Z36.9 ENCOUNTER FOR ANTENATAL SCREENING, UNSPECIFIED: Primary | ICD-10-CM

## 2024-12-26 DIAGNOSIS — O26.899 HEARTBURN DURING PREGNANCY, ANTEPARTUM: ICD-10-CM

## 2024-12-26 DIAGNOSIS — Z34.93 PRENATAL CARE IN THIRD TRIMESTER: ICD-10-CM

## 2024-12-26 DIAGNOSIS — O99.019 MATERNAL ANEMIA IN PREGNANCY, ANTEPARTUM: ICD-10-CM

## 2024-12-26 DIAGNOSIS — Z82.79 FAMILY HISTORY OF DOWN SYNDROME: ICD-10-CM

## 2024-12-26 DIAGNOSIS — O12.13 PROTEINURIA AFFECTING PREGNANCY IN THIRD TRIMESTER: ICD-10-CM

## 2024-12-26 DIAGNOSIS — Z87.59 HISTORY OF PRIOR PREGNANCY WITH IUGR NEWBORN: ICD-10-CM

## 2024-12-26 LAB
ALBUMIN SERPL-MCNC: 3.4 G/DL (ref 3.5–5.2)
ALBUMIN/GLOB SERPL: 1.2 G/DL
ALP SERPL-CCNC: 249 U/L (ref 39–117)
ALT SERPL-CCNC: 9 U/L (ref 1–33)
AST SERPL-CCNC: 18 U/L (ref 1–32)
BILIRUB BLD-MCNC: NEGATIVE MG/DL
BILIRUB SERPL-MCNC: 0.2 MG/DL (ref 0–1.2)
BUN SERPL-MCNC: 6 MG/DL (ref 6–20)
BUN/CREAT SERPL: 10 (ref 7–25)
CALCIUM SERPL-MCNC: 9.1 MG/DL (ref 8.6–10.5)
CHLORIDE SERPL-SCNC: 108 MMOL/L (ref 98–107)
CLARITY, POC: CLEAR
CO2 SERPL-SCNC: 21.4 MMOL/L (ref 22–29)
COLOR UR: YELLOW
CREAT SERPL-MCNC: 0.6 MG/DL (ref 0.57–1)
EGFRCR SERPLBLD CKD-EPI 2021: 129.5 ML/MIN/1.73
GLOBULIN SER CALC-MCNC: 2.9 GM/DL
GLUCOSE SERPL-MCNC: 78 MG/DL (ref 65–99)
GLUCOSE UR STRIP-MCNC: NEGATIVE MG/DL
KETONES UR QL: ABNORMAL
LEUKOCYTE EST, POC: NEGATIVE
NITRITE UR-MCNC: NEGATIVE MG/ML
PH UR: 6 [PH] (ref 5–8)
POTASSIUM SERPL-SCNC: 4.4 MMOL/L (ref 3.5–5.2)
PROT SERPL-MCNC: 6.3 G/DL (ref 6–8.5)
PROT UR STRIP-MCNC: ABNORMAL MG/DL
RBC # UR STRIP: NEGATIVE /UL
SODIUM SERPL-SCNC: 139 MMOL/L (ref 136–145)
SP GR UR: 1.01 (ref 1–1.03)
UROBILINOGEN UR QL: NORMAL

## 2024-12-26 NOTE — PROGRESS NOTES
OB follow up     Shun Tafoya is a 24 y.o.  38w3d being seen today for her obstetrical visit.  Patient reports  no HA, visual changes or RUQ pain.  . Fetal movement: normal. She is interested in IOL > 39 weeks. This is the first time I am seeing this patient in this pregnancy and all of her problems are new to me, the examiner.       Review of Systems  No bleeding, No cramping/contractions     /88   Wt 71.2 kg (157 lb)   LMP 2024   BMI 28.72 kg/m²     FHT: 125 BPM   Uterine Size: 37  cm     50%, 3 cm, - 3 station  Assessment/Plan:    1) 24 y.o.  -pregnancy at 38w3d- GBS neg. Set up for IOL at 39.1 weeks on Tuesday Dec 31. Labor warnings reviewed.     2) Proteinuria and high normal BP today- check CMP, CBC and Pr;CR ratio    3) H/O SGA infant in previous pregnancy- growth US on 2024 = 52%    4) Anemia- HgB 11.0 on 24. On daily iron but takes only a few times a week. Check CBC    5) GERD- on Pepcid 20 mg BID    6) Family history of Down's in a cousinCF/SMA/FX/NIPT/AFP neg    7) H/O pp endometritis after first delivery.     8)  x 2; proven pelvis- 7#14oz    9) Reviewed this stage of pregnancy. Plans OCPeds, circ, DMPA, epidural and breast/bottle    10)Early 2hr GTT passed; repeat at routine 28wga     11)Problem list updated     12) Schedule IOL on 2024 at 39.1 weeks.     13) Time Spent: I spent > 30  minutes caring for Shun on this date of service. This time includes time spent by me in the following activities: preparing for the visit, reviewing tests, obtaining and/or reviewing a separately obtained history, performing a medically appropriate examination and/or evaluation, counseling and educating the patient/family/caregiver, ordering medications, tests, or procedures, referring and communicating with other health care professionals, documenting information in the medical record, independently interpreting results and communicating that information with the  patient/family/caregiver, and care coordination.         Cheyenne Robertson MD    12/26/2024  11:13 EST

## 2024-12-27 LAB
AMPHETAMINES UR QL SCN: NEGATIVE NG/ML
BARBITURATES UR QL SCN: NEGATIVE NG/ML
BENZODIAZ UR QL SCN: NEGATIVE NG/ML
BUPRENORPHINE UR QL: NEGATIVE NG/ML
BZE UR QL SCN: NEGATIVE NG/ML
CANNABINOIDS UR QL SCN: NEGATIVE NG/ML
CREAT UR-MCNC: 63.3 MG/DL (ref 20–300)
CREAT UR-MCNC: 68.3 MG/DL
FENTANYL UR-MCNC: NEGATIVE PG/ML
LABORATORY COMMENT REPORT: NORMAL
MEPERIDINE UR QL: NEGATIVE NG/ML
METHADONE UR QL SCN: NEGATIVE NG/ML
OPIATES UR QL SCN: NEGATIVE NG/ML
OXYCODONE+OXYMORPHONE UR QL SCN: NEGATIVE NG/ML
PCP UR QL: NEGATIVE NG/ML
PH UR: 6 [PH] (ref 4.5–8.9)
PROPOXYPH UR QL SCN: NEGATIVE NG/ML
PROT UR-MCNC: 9.4 MG/DL
PROT/CREAT UR: 138 MG/G CREAT (ref 0–200)
TRAMADOL UR QL SCN: NEGATIVE NG/ML

## 2025-01-02 ENCOUNTER — ANESTHESIA EVENT (OUTPATIENT)
Dept: OBSTETRICS AND GYNECOLOGY | Facility: HOSPITAL | Age: 25
End: 2025-01-02
Payer: MEDICAID

## 2025-01-02 ENCOUNTER — HOSPITAL ENCOUNTER (OUTPATIENT)
Dept: LABOR AND DELIVERY | Facility: HOSPITAL | Age: 25
Discharge: HOME | End: 2025-01-02
Payer: MEDICAID

## 2025-01-02 ENCOUNTER — HOSPITAL ENCOUNTER (INPATIENT)
Facility: HOSPITAL | Age: 25
LOS: 2 days | Discharge: HOME | End: 2025-01-04
Attending: OBSTETRICS & GYNECOLOGY | Admitting: OBSTETRICS & GYNECOLOGY
Payer: MEDICAID

## 2025-01-02 ENCOUNTER — ANESTHESIA (OUTPATIENT)
Dept: OBSTETRICS AND GYNECOLOGY | Facility: HOSPITAL | Age: 25
End: 2025-01-02
Payer: MEDICAID

## 2025-01-02 PROBLEM — Z37.9 NORMAL LABOR: Status: ACTIVE | Noted: 2025-01-02

## 2025-01-02 PROBLEM — O26.899 HEARTBURN DURING PREGNANCY: Status: RESOLVED | Noted: 2024-08-21 | Resolved: 2025-01-02

## 2025-01-02 PROBLEM — Z34.90 ENCOUNTER FOR PLANNED INDUCTION OF LABOR: Status: RESOLVED | Noted: 2025-01-02 | Resolved: 2025-01-02

## 2025-01-02 PROBLEM — Z34.90 ENCOUNTER FOR PLANNED INDUCTION OF LABOR: Status: ACTIVE | Noted: 2025-01-02

## 2025-01-02 PROBLEM — Z34.93 PRENATAL CARE IN THIRD TRIMESTER: Status: RESOLVED | Noted: 2024-06-26 | Resolved: 2025-01-02

## 2025-01-02 PROBLEM — R12 HEARTBURN DURING PREGNANCY: Status: RESOLVED | Noted: 2024-08-21 | Resolved: 2025-01-02

## 2025-01-02 LAB
ABO GROUP BLD: NORMAL
AMPHET+METHAMPHET UR QL: NEGATIVE
AMPHETAMINES UR QL: NEGATIVE
BARBITURATES UR QL SCN: NEGATIVE
BENZODIAZ UR QL SCN: NEGATIVE
BLD GP AB SCN SERPL QL: NEGATIVE
BUPRENORPHINE SERPL-MCNC: NEGATIVE NG/ML
CANNABINOIDS SERPL QL: NEGATIVE
COCAINE UR QL: NEGATIVE
DEPRECATED RDW RBC AUTO: 46.5 FL (ref 37–54)
ERYTHROCYTE [DISTWIDTH] IN BLOOD BY AUTOMATED COUNT: 14.6 % (ref 12.3–15.4)
HCT VFR BLD AUTO: 33.9 % (ref 34–46.6)
HGB BLD-MCNC: 10.9 G/DL (ref 12–15.9)
MCH RBC QN AUTO: 27.8 PG (ref 26.6–33)
MCHC RBC AUTO-ENTMCNC: 32.2 G/DL (ref 31.5–35.7)
MCV RBC AUTO: 86.5 FL (ref 79–97)
METHADONE UR QL SCN: NEGATIVE
OPIATES UR QL: NEGATIVE
OXYCODONE UR QL SCN: NEGATIVE
PCP UR QL SCN: NEGATIVE
PLATELET # BLD AUTO: 376 10*3/MM3 (ref 140–450)
PMV BLD AUTO: 11.6 FL (ref 6–12)
RBC # BLD AUTO: 3.92 10*6/MM3 (ref 3.77–5.28)
RH BLD: POSITIVE
T&S EXPIRATION DATE: NORMAL
TREPONEMA PALLIDUM IGG+IGM AB [PRESENCE] IN SERUM OR PLASMA BY IMMUNOASSAY: NORMAL
TRICYCLICS UR QL SCN: NEGATIVE
WBC NRBC COR # BLD AUTO: 14.1 10*3/MM3 (ref 3.4–10.8)

## 2025-01-02 PROCEDURE — 86901 BLOOD TYPING SEROLOGIC RH(D): CPT | Performed by: OBSTETRICS & GYNECOLOGY

## 2025-01-02 PROCEDURE — 25010000002 ONDANSETRON PER 1 MG: Performed by: OBSTETRICS & GYNECOLOGY

## 2025-01-02 PROCEDURE — 25010000002 LIDOCAINE-EPINEPHRINE (PF) 1.5 %-1:200000 SOLUTION: Performed by: NURSE ANESTHETIST, CERTIFIED REGISTERED

## 2025-01-02 PROCEDURE — 59410 OBSTETRICAL CARE: CPT | Performed by: OBSTETRICS & GYNECOLOGY

## 2025-01-02 PROCEDURE — 86850 RBC ANTIBODY SCREEN: CPT | Performed by: OBSTETRICS & GYNECOLOGY

## 2025-01-02 PROCEDURE — C1755 CATHETER, INTRASPINAL: HCPCS | Performed by: NURSE ANESTHETIST, CERTIFIED REGISTERED

## 2025-01-02 PROCEDURE — 85027 COMPLETE CBC AUTOMATED: CPT | Performed by: OBSTETRICS & GYNECOLOGY

## 2025-01-02 PROCEDURE — 25810000003 LACTATED RINGERS PER 1000 ML: Performed by: OBSTETRICS & GYNECOLOGY

## 2025-01-02 PROCEDURE — 86780 TREPONEMA PALLIDUM: CPT | Performed by: OBSTETRICS & GYNECOLOGY

## 2025-01-02 PROCEDURE — 86900 BLOOD TYPING SEROLOGIC ABO: CPT | Performed by: OBSTETRICS & GYNECOLOGY

## 2025-01-02 PROCEDURE — 80306 DRUG TEST PRSMV INSTRMNT: CPT | Performed by: OBSTETRICS & GYNECOLOGY

## 2025-01-02 RX ORDER — SODIUM CHLORIDE 9 MG/ML
40 INJECTION, SOLUTION INTRAVENOUS AS NEEDED
Status: DISCONTINUED | OUTPATIENT
Start: 2025-01-02 | End: 2025-01-04 | Stop reason: HOSPADM

## 2025-01-02 RX ORDER — ACETAMINOPHEN 325 MG/1
650 TABLET ORAL EVERY 6 HOURS PRN
Status: DISCONTINUED | OUTPATIENT
Start: 2025-01-02 | End: 2025-01-02 | Stop reason: SDUPTHER

## 2025-01-02 RX ORDER — LIDOCAINE HYDROCHLORIDE AND EPINEPHRINE 15; 5 MG/ML; UG/ML
INJECTION, SOLUTION EPIDURAL AS NEEDED
Status: DISCONTINUED | OUTPATIENT
Start: 2025-01-02 | End: 2025-01-02 | Stop reason: SURG

## 2025-01-02 RX ORDER — IBUPROFEN 600 MG/1
600 TABLET, FILM COATED ORAL EVERY 6 HOURS PRN
Status: DISCONTINUED | OUTPATIENT
Start: 2025-01-02 | End: 2025-01-04 | Stop reason: HOSPADM

## 2025-01-02 RX ORDER — CARBOPROST TROMETHAMINE 250 UG/ML
250 INJECTION, SOLUTION INTRAMUSCULAR
Status: DISCONTINUED | OUTPATIENT
Start: 2025-01-02 | End: 2025-01-04 | Stop reason: HOSPADM

## 2025-01-02 RX ORDER — FENTANYL/BUPIVACAINE/NS/PF 2-1250MCG
PLASTIC BAG, INJECTION (ML) INJECTION CONTINUOUS
Status: DISCONTINUED | OUTPATIENT
Start: 2025-01-02 | End: 2025-01-04 | Stop reason: HOSPADM

## 2025-01-02 RX ORDER — LIDOCAINE HYDROCHLORIDE 10 MG/ML
0.5 INJECTION, SOLUTION EPIDURAL; INFILTRATION; INTRACAUDAL; PERINEURAL ONCE AS NEEDED
Status: DISCONTINUED | OUTPATIENT
Start: 2025-01-02 | End: 2025-01-04 | Stop reason: HOSPADM

## 2025-01-02 RX ORDER — OXYTOCIN/0.9 % SODIUM CHLORIDE 30/500 ML
250 PLASTIC BAG, INJECTION (ML) INTRAVENOUS CONTINUOUS
Status: ACTIVE | OUTPATIENT
Start: 2025-01-02 | End: 2025-01-02

## 2025-01-02 RX ORDER — PRENATAL VIT/IRON FUM/FOLIC AC 27MG-0.8MG
1 TABLET ORAL DAILY
Status: DISCONTINUED | OUTPATIENT
Start: 2025-01-02 | End: 2025-01-04 | Stop reason: HOSPADM

## 2025-01-02 RX ORDER — OXYTOCIN/0.9 % SODIUM CHLORIDE 30/500 ML
999 PLASTIC BAG, INJECTION (ML) INTRAVENOUS ONCE
Status: DISCONTINUED | OUTPATIENT
Start: 2025-01-02 | End: 2025-01-04 | Stop reason: HOSPADM

## 2025-01-02 RX ORDER — SODIUM CHLORIDE 0.9 % (FLUSH) 0.9 %
1-10 SYRINGE (ML) INJECTION AS NEEDED
Status: DISCONTINUED | OUTPATIENT
Start: 2025-01-02 | End: 2025-01-04 | Stop reason: HOSPADM

## 2025-01-02 RX ORDER — FENTANYL/BUPIVACAINE/NS/PF 2-1250MCG
PLASTIC BAG, INJECTION (ML) INJECTION
Status: COMPLETED
Start: 2025-01-02 | End: 2025-01-02

## 2025-01-02 RX ORDER — OXYTOCIN/0.9 % SODIUM CHLORIDE 30/500 ML
125 PLASTIC BAG, INJECTION (ML) INTRAVENOUS ONCE AS NEEDED
Status: DISCONTINUED | OUTPATIENT
Start: 2025-01-02 | End: 2025-01-04 | Stop reason: HOSPADM

## 2025-01-02 RX ORDER — MISOPROSTOL 200 UG/1
TABLET ORAL
Status: DISCONTINUED
Start: 2025-01-02 | End: 2025-01-02 | Stop reason: WASHOUT

## 2025-01-02 RX ORDER — BISACODYL 10 MG
10 SUPPOSITORY, RECTAL RECTAL DAILY PRN
Status: DISCONTINUED | OUTPATIENT
Start: 2025-01-03 | End: 2025-01-04 | Stop reason: HOSPADM

## 2025-01-02 RX ORDER — MISOPROSTOL 200 UG/1
800 TABLET ORAL ONCE AS NEEDED
Status: DISCONTINUED | OUTPATIENT
Start: 2025-01-02 | End: 2025-01-04 | Stop reason: HOSPADM

## 2025-01-02 RX ORDER — OXYTOCIN/0.9 % SODIUM CHLORIDE 30/500 ML
2-20 PLASTIC BAG, INJECTION (ML) INTRAVENOUS
Status: DISCONTINUED | OUTPATIENT
Start: 2025-01-02 | End: 2025-01-04 | Stop reason: HOSPADM

## 2025-01-02 RX ORDER — SODIUM CHLORIDE, SODIUM LACTATE, POTASSIUM CHLORIDE, CALCIUM CHLORIDE 600; 310; 30; 20 MG/100ML; MG/100ML; MG/100ML; MG/100ML
125 INJECTION, SOLUTION INTRAVENOUS CONTINUOUS
Status: ACTIVE | OUTPATIENT
Start: 2025-01-02 | End: 2025-01-03

## 2025-01-02 RX ORDER — ACETAMINOPHEN 325 MG/1
650 TABLET ORAL EVERY 6 HOURS PRN
Status: DISCONTINUED | OUTPATIENT
Start: 2025-01-02 | End: 2025-01-04 | Stop reason: HOSPADM

## 2025-01-02 RX ORDER — METHYLERGONOVINE MALEATE 0.2 MG/ML
200 INJECTION INTRAVENOUS ONCE AS NEEDED
Status: DISCONTINUED | OUTPATIENT
Start: 2025-01-02 | End: 2025-01-04 | Stop reason: HOSPADM

## 2025-01-02 RX ORDER — SODIUM CHLORIDE 0.9 % (FLUSH) 0.9 %
10 SYRINGE (ML) INJECTION EVERY 12 HOURS SCHEDULED
Status: DISCONTINUED | OUTPATIENT
Start: 2025-01-02 | End: 2025-01-04 | Stop reason: HOSPADM

## 2025-01-02 RX ORDER — HYDROCODONE BITARTRATE AND ACETAMINOPHEN 5; 325 MG/1; MG/1
1 TABLET ORAL EVERY 4 HOURS PRN
Status: DISCONTINUED | OUTPATIENT
Start: 2025-01-02 | End: 2025-01-04 | Stop reason: HOSPADM

## 2025-01-02 RX ORDER — HYDROCODONE BITARTRATE AND ACETAMINOPHEN 10; 325 MG/1; MG/1
1 TABLET ORAL EVERY 4 HOURS PRN
Status: DISCONTINUED | OUTPATIENT
Start: 2025-01-02 | End: 2025-01-04 | Stop reason: HOSPADM

## 2025-01-02 RX ORDER — HYDROCORTISONE 25 MG/G
1 CREAM TOPICAL AS NEEDED
Status: DISCONTINUED | OUTPATIENT
Start: 2025-01-02 | End: 2025-01-04 | Stop reason: HOSPADM

## 2025-01-02 RX ORDER — MAGNESIUM CARB/ALUMINUM HYDROX 105-160MG
30 TABLET,CHEWABLE ORAL ONCE
Status: DISCONTINUED | OUTPATIENT
Start: 2025-01-02 | End: 2025-01-04 | Stop reason: HOSPADM

## 2025-01-02 RX ORDER — ONDANSETRON 2 MG/ML
4 INJECTION INTRAMUSCULAR; INTRAVENOUS EVERY 6 HOURS PRN
Status: DISCONTINUED | OUTPATIENT
Start: 2025-01-02 | End: 2025-01-04 | Stop reason: HOSPADM

## 2025-01-02 RX ORDER — DOCUSATE SODIUM 100 MG/1
100 CAPSULE, LIQUID FILLED ORAL 2 TIMES DAILY
Status: DISCONTINUED | OUTPATIENT
Start: 2025-01-02 | End: 2025-01-04 | Stop reason: HOSPADM

## 2025-01-02 RX ORDER — ACETAMINOPHEN 325 MG/1
650 TABLET ORAL EVERY 4 HOURS PRN
Status: DISCONTINUED | OUTPATIENT
Start: 2025-01-02 | End: 2025-01-02 | Stop reason: SDUPTHER

## 2025-01-02 RX ORDER — EPHEDRINE SULFATE 5 MG/ML
10 INJECTION INTRAVENOUS
Status: DISCONTINUED | OUTPATIENT
Start: 2025-01-02 | End: 2025-01-04 | Stop reason: HOSPADM

## 2025-01-02 RX ORDER — SODIUM CHLORIDE 0.9 % (FLUSH) 0.9 %
10 SYRINGE (ML) INJECTION AS NEEDED
Status: DISCONTINUED | OUTPATIENT
Start: 2025-01-02 | End: 2025-01-04 | Stop reason: HOSPADM

## 2025-01-02 RX ADMIN — Medication 10 ML/HR: at 09:03

## 2025-01-02 RX ADMIN — PRENATAL VIT W/ FE FUMARATE-FA TAB 27-0.8 MG 1 TABLET: 27-0.8 TAB at 20:47

## 2025-01-02 RX ADMIN — SODIUM CHLORIDE, SODIUM LACTATE, POTASSIUM CHLORIDE, CALCIUM CHLORIDE 125 ML/HR: 20; 30; 600; 310 INJECTION, SOLUTION INTRAVENOUS at 09:34

## 2025-01-02 RX ADMIN — ONDANSETRON 4 MG: 2 INJECTION INTRAMUSCULAR; INTRAVENOUS at 13:30

## 2025-01-02 RX ADMIN — LIDOCAINE HYDROCHLORIDE AND EPINEPHRINE 2 MG: 15; 5 INJECTION, SOLUTION EPIDURAL at 09:00

## 2025-01-02 RX ADMIN — IBUPROFEN 600 MG: 600 TABLET, FILM COATED ORAL at 18:12

## 2025-01-02 RX ADMIN — DOCUSATE SODIUM 100 MG: 100 CAPSULE, LIQUID FILLED ORAL at 20:48

## 2025-01-02 RX ADMIN — LIDOCAINE HYDROCHLORIDE AND EPINEPHRINE 3 MG: 15; 5 INJECTION, SOLUTION EPIDURAL at 08:55

## 2025-01-02 RX ADMIN — Medication 2 MILLI-UNITS/MIN: at 07:31

## 2025-01-02 RX ADMIN — SODIUM CHLORIDE, SODIUM LACTATE, POTASSIUM CHLORIDE, CALCIUM CHLORIDE 125 ML/HR: 20; 30; 600; 310 INJECTION, SOLUTION INTRAVENOUS at 05:21

## 2025-01-02 NOTE — ANESTHESIA PROCEDURE NOTES
Labor Epidural    Pre-sedation assessment completed: 1/2/2025 8:40 AM    Patient reassessed immediately prior to procedure    Patient location during procedure: OB  Start Time: 1/2/2025 8:52 AM  Stop Time: 1/2/2025 8:55 AM  Indication:at surgeon's request  Performed By  CRNA/CAA: Serene Awad CRNA  Preanesthetic Checklist  Completed: patient identified, IV checked, site marked, risks and benefits discussed, surgical consent, monitors and equipment checked, pre-op evaluation and timeout performed  Prep:  Pt Position:sitting  Sterile Tech:cap, gown, gloves, mask and sterile barrier  Prep:chlorhexidine gluconate and isopropyl alcohol  Monitoring:blood pressure monitoring, continuous pulse oximetry and EKG  Epidural Block Procedure:  Approach:midline  Guidance:landmark technique  Location:L4-L5  Needle Type:Tuohy  Needle Gauge:19 G  Loss of Resistance Medium: air  Loss of Resistance: 4cm  Cath Depth at skin:9 cm  Paresthesia: none  Aspiration:negative  Number of Attempts: 1  Post Assessment:  Dressing:secured with tape  Pt Tolerance:patient tolerated the procedure well with no apparent complications  Complications:no

## 2025-01-02 NOTE — ANESTHESIA POSTPROCEDURE EVALUATION
Patient: Shun Tafoya    Procedure Summary       Date: 01/02/25 Room / Location:     Anesthesia Start: 0840 Anesthesia Stop: 1437    Procedure: LABOR ANALGESIA Diagnosis:     Scheduled Providers:  Provider: Serene Awad CRNA    Anesthesia Type: epidural ASA Status: 2            Anesthesia Type: epidural    Vitals  Vitals Value Taken Time   /57 01/02/25 1447   Temp 97.6 °F (36.4 °C) 01/02/25 1330   Pulse 93 01/02/25 1447   Resp     SpO2 100 % 01/02/25 0945   Vitals shown include unfiled device data.        Post Anesthesia Care and Evaluation    Patient location during evaluation: bedside  Patient participation: complete - patient participated  Level of consciousness: awake and alert  Pain score: 0  Pain management: adequate    Airway patency: patent  Anesthetic complications: No anesthetic complications  PONV Status: none  Cardiovascular status: acceptable  Respiratory status: acceptable  Hydration status: acceptable

## 2025-01-02 NOTE — ANESTHESIA PREPROCEDURE EVALUATION
Anesthesia Evaluation     Patient summary reviewed and Nursing notes reviewed   no history of anesthetic complications:   NPO Solid Status: N/A  NPO Liquid Status: N/A           Airway   Mallampati: II  TM distance: >3 FB  Neck ROM: full  Possible difficult intubation  Dental      Pulmonary     breath sounds clear to auscultation  (+) a smoker Former, vape,  Cardiovascular   Exercise tolerance: good (4-7 METS)    Rhythm: regular  Rate: normal        Neuro/Psych  (+) psychiatric history Anxiety and Depression  GI/Hepatic/Renal/Endo    (+) GERD    Musculoskeletal (-) negative ROS    Abdominal    Substance History - negative use     OB/GYN    (+) Pregnant        Other - negative ROS                     Anesthesia Plan    ASA 2     epidural     intravenous induction     Anesthetic plan, risks, benefits, and alternatives have been provided, discussed and informed consent has been obtained with: patient.    Use of blood products discussed with patient  Consented to blood products.      CODE STATUS:    Level Of Support Discussed With: Patient  Code Status (Patient has no pulse and is not breathing): CPR (Attempt to Resuscitate)  Medical Interventions (Patient has pulse or is breathing): Full Support

## 2025-01-02 NOTE — H&P
OB HISTORY AND PHYSICAL      Patient Care Team:  Provider, No Known as PCP - General    Chief complaint: Encounter for planned induction of labor    24 y.o.  . Patient's last menstrual period was 2024. = 39w3d     HPI: History of Present Illness  Pt here for elective IOL      ACTIVE PROBLEM LIST:     Encounter for planned induction of labor    Family history of Down syndrome- maternal cousin    Elevated hemoglobin A1c    Maternal anemia in pregnancy, antepartum       PMHx:   Past Medical History:   Diagnosis Date    Anemia     Anxiety     Depression     Urinary tract infection     this pregnancy       PSHx:   Past Surgical History:   Procedure Laterality Date    APPENDECTOMY         Social Hx:   Social History     Socioeconomic History    Marital status: Single     Spouse name: cari serrano   Tobacco Use    Smoking status: Never    Smokeless tobacco: Never   Vaping Use    Vaping status: Former   Substance and Sexual Activity    Alcohol use: No    Drug use: No    Sexual activity: Yes     Partners: Male       FHx:   Family History   Problem Relation Age of Onset    Drug abuse Father     Depression Father     Mental illness Mother     Drug abuse Mother     Depression Mother     Malig Hyperthermia Paternal Grandfather     Hypertension Paternal Grandfather     Hyperlipidemia Paternal Grandfather     Heart disease Paternal Grandfather     Arthritis Paternal Grandfather     Diabetes Paternal Grandfather     Miscarriages / Stillbirths Paternal Grandmother     Depression Paternal Grandmother     Birth defects Maternal Aunt     Down syndrome Maternal Cousin        Debilities/Disabilities Identified: None    Emotional Behavior: Appropriate    PGyn Hx:  otherwise neg    POBHx:   OB History    Para Term  AB Living   3 2 2 0 0 2   SAB IAB Ectopic Molar Multiple Live Births   0 0 0 0 0 2      # Outcome Date GA Lbr Juan A/2nd Weight Sex Type Anes PTL Lv   3 Current            2 Term 23 40w1d / 00:15  3590 g (7 lb 14.6 oz) F Vag-Spont EPI N ROHTI      Name: RAINE SPICER      Apgar1: 8  Apgar5: 9   1 Term 19 39w2d 10:05 / 00:35 2951 g (6 lb 8.1 oz) F Vag-Spont EPI  ROHIT      Name: RAINE SPICER      Apgar1: 9  Apgar5: 9      Obstetric Comments    x 2; proven pelvis- 7#14oz       Allergies: Patient has no known allergies.    Medications:   Medications Prior to Admission   Medication Sig Dispense Refill Last Dose/Taking    famotidine (PEPCID) 20 MG tablet TAKE 1 TABLET BY MOUTH TWICE A  tablet 0 Past Week    ferrous gluconate (FERGON) 324 MG tablet Take 1 tablet by mouth Daily With Breakfast. 100 tablet 2 Past Month    fluticasone (FLONASE) 50 MCG/ACT nasal spray Administer 2 sprays into the nostril(s) as directed by provider Daily. 9.9 mL 0 More than a month    magnesium oxide (MAG-OX) 400 MG tablet Take 1 tablet by mouth Daily. 30 tablet 1 More than a month    ondansetron ODT (ZOFRAN-ODT) 4 MG disintegrating tablet Place 1 tablet on the tongue Every 8 (Eight) Hours As Needed for Nausea or Vomiting (can increase to tablets every 8 hours as needed). 30 tablet 2 More than a month    Prenatal Vit-Fe Fumarate-FA (PRENATAL PO) Take  by mouth.   More than a month                              Current Facility-Administered Medications:     acetaminophen (TYLENOL) tablet 650 mg, 650 mg, Oral, Q4H PRN, Henrry Claros MD    lactated ringers bolus 1,000 mL, 1,000 mL, Intravenous, Once PRN, Henrry Claros MD    lactated ringers infusion, 125 mL/hr, Intravenous, Continuous, Henrry Claros MD, Last Rate: 125 mL/hr at 25, 125 mL/hr at 25 05    lidocaine PF 1% (XYLOCAINE) injection 0.5 mL, 0.5 mL, Intradermal, Once PRN, Henrry Claros MD    mineral oil liquid 30 mL, 30 mL, Topical, Once, Henrry Claros MD    oxytocin (PITOCIN) 30 units in 0.9% sodium chloride 500 mL (premix), 2-20 chelsea-units/min, Intravenous,  "Titrated, Henrry Claros MD    sodium chloride 0.9 % flush 10 mL, 10 mL, Intravenous, Q12H, Henrry Claros MD    sodium chloride 0.9 % flush 10 mL, 10 mL, Intravenous, PRN, Henrry Claros MD    sodium chloride 0.9 % infusion 40 mL, 40 mL, Intravenous, PRN, Henrry Claros MD    Review of Systems   Constitutional: Negative.    HENT: Negative.     Eyes: Negative.    Respiratory: Negative.     Cardiovascular: Negative.    Gastrointestinal: Negative.    Endocrine: Negative.    Musculoskeletal: Negative.    Skin: Negative.    Allergic/Immunologic: Negative.    Neurological: Negative.    Hematological: Negative.    Psychiatric/Behavioral: Negative.         Vital Signs  /72   Pulse 77   Temp 98 °F (36.7 °C) (Oral)   Resp 16   Ht 157.5 cm (62\")   Wt 71.2 kg (157 lb)   LMP 04/01/2024   Breastfeeding Yes   BMI 28.72 kg/m²     Physical Exam  Vitals and nursing note reviewed.   Constitutional:       Appearance: She is well-developed.   HENT:      Head: Normocephalic and atraumatic.   Cardiovascular:      Rate and Rhythm: Normal rate.   Pulmonary:      Effort: Pulmonary effort is normal.   Abdominal:      General: There is no distension.      Palpations: Abdomen is soft. There is no mass.      Tenderness: There is no abdominal tenderness. There is no guarding.   Genitourinary:     Vagina: No vaginal discharge.   Musculoskeletal:         General: No tenderness or deformity. Normal range of motion.      Cervical back: Normal range of motion.   Skin:     General: Skin is warm and dry.      Coloration: Skin is not pale.      Findings: No erythema or rash.   Neurological:      Mental Status: She is alert and oriented to person, place, and time.   Psychiatric:         Behavior: Behavior normal.         Thought Content: Thought content normal.         Judgment: Judgment normal.             Presentation: cephalic   Cervix: Exam by:     Dilation: Cervical Dilation (cm): 4-5 "   Effacement: Cervical Effacement: 60   Station:         Assessment:      Encounter for planned induction of labor    Family history of Down syndrome- maternal cousin    Elevated hemoglobin A1c    Maternal anemia in pregnancy, antepartum      1.  Intrauterine pregnancy at 39w3d gestation with reactive fetal status.    2.  induction of labor  for pt choice  with favorable cervix  3.  Obstetrical history significant for  as above .  4.  GBS status:   Strep Gp B Culture   Date Value Ref Range Status   2024 Negative Negative Final     Comment:     Centers for Disease Control and Prevention (CDC) and American Congress  of Obstetricians and Gynecologists (ACOG) guidelines for prevention of   group B streptococcal (GBS) disease specify co-collection of  a vaginal and rectal swab specimen to maximize sensitivity of GBS  detection. Per the CDC and ACOG, swabbing both the lower vagina and  rectum substantially increases the yield of detection compared with  sampling the vagina alone.  Penicillin G, ampicillin, or cefazolin are indicated for intrapartum  prophylaxis of  GBS colonization. Reflex susceptibility  testing should be performed prior to use of clindamycin only on GBS  isolates from penicillin-allergic women who are considered a high risk  for anaphylaxis. Treatment with vancomycin without additional testing  is warranted if resistance to clindamycin is noted.         Plan:  1. Vaginal anticipated  2. Plan of care has been reviewed with patient   3.  Risks, benefits of treatment plan have been discussed.  4.  All questions have been answered.          I discussed the patient's findings and my recommendations with patient:    For induction of labor:   Induction of Labor  Risks have been outlined to include (but not limited to) infection, uterine tetany with uterine rupture and/or fetal distress, need for emergent  delivery and possible  hysterectomy, possible postpartum  hemorrhage secondary to uterine atony which could be substantial enough to require blood transfusion with the inherent risks of hepatitis, AIDS, HIV infection as well as transfusion reaction. She understands the need for use of this induction method and desires to proceed. There is no evidence of cephalopelvic disproportion or fetal distress, or any contraindication to the induction technique selected    Consent for Vaginal Delivery or  Section:   Pt. counseled as to risk of labor and delivery including but not limited to infection, bleeding (with possible need for blood transfusion, and its inherent risks of virus transmission, i.e. HIV, Hepatitis; possible transfusion reaction), possible emergent  section with its risks of damage to internal organs and necessary repairs, possible operative vaginal delivery, NON routine use of episiotomy and routine use of internal monitors and associated risks, anesthetic complications, injury to infant or mother at time of delivery, maternal or fetal death.    I counseled pt and her significant other/family member ref: indications for a  delivery including but not limited to 1. Maternal intolerance to labor, 2. Fetal intolerance to labor or fetal distress, 3. Failed induction, CPD or dystocia.  Pt and her family verbalized their understanding.   The pt. understands these risks and is willing to proceed.  All of her questions were answered.     .    Henrry Claros MD  25  06:01 EST

## 2025-01-02 NOTE — L&D DELIVERY NOTE
" [facility]   Vaginal Delivery Note    Patient Name: Shun Tafoya  : 2000  MRN: [id number]    Date of Delivery:     Diagnosis     Pre & Post-Delivery:  Intrauterine pregnancy at 39w3d  Labor status:       (normal spontaneous vaginal delivery): 25, male, circ    Family history of Down syndrome- maternal cousin    Elevated hemoglobin A1c    Maternal anemia in pregnancy, antepartum             Problem List    Transfer to Postpartum     Review the Delivery Report for details.     Delivery     Delivery:      Date of Birth:     Time of Birth:  Gestational Age   39w3d     Anesthesia: Epidural    Delivering clinician:  Harlan   Forceps?   No   Vacuum? No    Shoulder dystocia present: No        Delivery narrative:    DELIVERY NOTE  24 y.o.  @ 39w3d    Pt pushed with reactive fetal heart tones till she was C/C/+3; was prepped and draped in the usual fashion.      Pt was delivered of 1 live viable male, from the ARACELI position, over an intact perineum.  Infant was bulb suctioned on the perineum.  There was no nuchal cord.  After uneventful delivery of shoulders, infant was completely delivered and placed on maternal chest for kangaroo care.  Cord was doubly clamped and divided and cord blood drawn.  Placenta was delivered spontaneously, intact with 3 vessel cord.  Lacs:  none.  Repairs none.  Pt tolerated procedure well and stayed in LDR in satisfactory condition.  All sponge, instrument and needle counts were correct x 3 according to staff.        Infant     Findings: male infant     Infant observations: Weight: No birth weight on file.  Length:   in  Observations/Comments:        Apgars:   @ 1 minute /      @ 5 minutes   Infant Name:      Placenta & Cord         Placenta delivered    at        Cord:   present.   Nuchal Cord?  no   Cord blood obtained:     Cord gases obtained:      Cord gas results: Venous:  No results found for: \"PHCVEN\", \"BECVEN\"    Arterial:  No results found for: \"PHCART\", " "\"BECART\"     Repair     Episiotomy: Not recorded    No    Lacerations: No   Estimated Blood Loss:  350cc     Quantitative Blood Loss:          Complications     none    Disposition     Mother to Mother Baby/Postpartum  in stable condition currently.  Baby to NBN  in stable condition currently.    Henrry Claros MD  01/02/25  14:50 EST        "

## 2025-01-02 NOTE — PLAN OF CARE
Problem: Adult Inpatient Plan of Care  Goal: Plan of Care Review  Outcome: Progressing  Flowsheets (Taken 1/2/2025 1648)  Outcome Evaluation: VSS, , uterus firm withou massage, rubra, scan, 1 cm below, voiding spontaneously,breastfeeding and pumping, bonding well with baby  Plan of Care Reviewed With: patient  Goal: Patient-Specific Goal (Individualized)  Outcome: Progressing  Goal: Absence of Hospital-Acquired Illness or Injury  Outcome: Progressing  Intervention: Identify and Manage Fall Risk  Recent Flowsheet Documentation  Taken 1/2/2025 1830 by Jimena Horta RN  Safety Promotion/Fall Prevention: safety round/check completed  Taken 1/2/2025 1650 by Jimena Horta RN  Safety Promotion/Fall Prevention: safety round/check completed  Taken 1/2/2025 1645 by Jimena Horta RN  Safety Promotion/Fall Prevention: safety round/check completed  Taken 1/2/2025 1500 by Jimena Horta RN  Safety Promotion/Fall Prevention: safety round/check completed  Taken 1/2/2025 1300 by Jimena Horta RN  Safety Promotion/Fall Prevention: safety round/check completed  Taken 1/2/2025 1100 by Jimena Horta RN  Safety Promotion/Fall Prevention: safety round/check completed  Taken 1/2/2025 0900 by Jimena Horta RN  Safety Promotion/Fall Prevention:   safety round/check completed   lighting adjusted  Taken 1/2/2025 0715 by Jimena Horta RN  Safety Promotion/Fall Prevention: safety round/check completed  Intervention: Prevent Skin Injury  Recent Flowsheet Documentation  Taken 1/2/2025 0715 by Jimena Horta RN  Body Position: supine  Intervention: Prevent Infection  Recent Flowsheet Documentation  Taken 1/2/2025 1645 by Jimena Horta RN  Infection Prevention: hand hygiene promoted  Taken 1/2/2025 0715 by Jimena Horta RN  Infection Prevention: hand hygiene promoted  Goal: Optimal Comfort and Wellbeing  Outcome: Progressing  Intervention: Provide Person-Centered Care  Recent Flowsheet Documentation  Taken 1/2/2025 1645  by Jimena Horta RN  Trust Relationship/Rapport:   care explained   emotional support provided   empathic listening provided   questions answered   questions encouraged   choices provided  Taken 1/2/2025 1009 by Jimena Horta RN  Trust Relationship/Rapport: care explained  Taken 1/2/2025 0715 by Jimena Horta RN  Trust Relationship/Rapport:   care explained   questions answered   questions encouraged  Goal: Readiness for Transition of Care  Outcome: Progressing     Problem: Labor  Goal: Hemostasis  Outcome: Progressing  Goal: Stable Fetal Wellbeing  Outcome: Progressing  Intervention: Promote and Monitor Fetal Wellbeing  Recent Flowsheet Documentation  Taken 1/2/2025 0715 by Jimena Horta RN  Body Position: supine  Goal: Effective Progression to Delivery  Outcome: Progressing  Goal: Absence of Infection Signs and Symptoms  Outcome: Progressing  Intervention: Prevent or Manage Infection  Recent Flowsheet Documentation  Taken 1/2/2025 1645 by Jimena Horta RN  Infection Prevention: hand hygiene promoted  Taken 1/2/2025 0715 by Jimena Horta RN  Infection Prevention: hand hygiene promoted  Goal: Acceptable Pain Control  Outcome: Progressing  Goal: Normal Uterine Contraction Pattern  Outcome: Progressing   Goal Outcome Evaluation:  Plan of Care Reviewed With: patient           Outcome Evaluation: VSS, , uterus firm withou massage, rubra, scan, 1 cm below, voiding spontaneously,breastfeeding and pumping, bonding well with baby

## 2025-01-02 NOTE — PROGRESS NOTES
"LABOR PROGRESS NOTE    S:  pt comfortable w/ epi.    O:  /68   Pulse 70   Temp 98.1 °F (36.7 °C) (Axillary)   Resp 18   Ht 157.5 cm (62\")   Wt 71.2 kg (157 lb)   LMP 04/01/2024   SpO2 99%   Breastfeeding Yes   BMI 28.72 kg/m²     FHTs Cat I    Cx: 5/60/-1    AROM:  clr    Cxts irregular on pitocin    A:  early active phase, stable fetus    P:  anticipate vaginal delivery    Henrry Claros MD  09:38 EST  01/02/25        "

## 2025-01-03 LAB
HCT VFR BLD AUTO: 32.1 % (ref 34–46.6)
HGB BLD-MCNC: 10.2 G/DL (ref 12–15.9)

## 2025-01-03 PROCEDURE — 0503F POSTPARTUM CARE VISIT: CPT | Performed by: NURSE PRACTITIONER

## 2025-01-03 PROCEDURE — 85018 HEMOGLOBIN: CPT | Performed by: OBSTETRICS & GYNECOLOGY

## 2025-01-03 PROCEDURE — 85014 HEMATOCRIT: CPT | Performed by: OBSTETRICS & GYNECOLOGY

## 2025-01-03 RX ADMIN — ACETAMINOPHEN 650 MG: 325 TABLET ORAL at 15:36

## 2025-01-03 RX ADMIN — IBUPROFEN 600 MG: 600 TABLET, FILM COATED ORAL at 05:36

## 2025-01-03 RX ADMIN — PRENATAL VIT W/ FE FUMARATE-FA TAB 27-0.8 MG 1 TABLET: 27-0.8 TAB at 08:16

## 2025-01-03 RX ADMIN — DOCUSATE SODIUM 100 MG: 100 CAPSULE, LIQUID FILLED ORAL at 08:16

## 2025-01-03 RX ADMIN — ACETAMINOPHEN 650 MG: 325 TABLET ORAL at 08:16

## 2025-01-03 RX ADMIN — IBUPROFEN 600 MG: 600 TABLET, FILM COATED ORAL at 19:40

## 2025-01-03 NOTE — PROGRESS NOTES
Patient: Shun Tafoya  * No surgery found *  Anesthesia type: No value filed.    Patient location: Labor and Delivery  Last vitals:   Vitals:    01/03/25 0752   BP: 120/58   Pulse: 84   Resp: 16   Temp: 97.9 °F (36.6 °C)   SpO2: 97%     Level of consciousness: awake, alert, and oriented    Post-anesthesia pain: adequate analgesia  Airway patency: patent  Respiratory: unassisted  Cardiovascular: stable and blood pressure at baseline  Hydration: euvolemic    Anesthetic complications: no

## 2025-01-03 NOTE — PROGRESS NOTES
" [place]  Vaginal Delivery Progress Note    Subjective   Subjective  Postpartum Day 1: Vaginal Delivery    The patient feels well.  Her pain is well controlled with nonsteroidal anti-inflammatory drugs.   She is ambulating well.  Patient describes her bleeding as thin lochia.    Breastfeeding: declines.    Objective     Objective   Vital Signs Range for the last 24 hours  Temperature: Temp:  [97.4 °F (36.3 °C)-98.1 °F (36.7 °C)] 97.9 °F (36.6 °C)   Temp Source: Temp src: Oral   BP: BP: ()/(52-72) 120/58   Pulse: Heart Rate:  [60-98] 84   Respirations: Resp:  [16-18] 16   SPO2: SpO2:  [97 %-100 %] 97 %   O2 Amount (l/min):     O2 Devices     Weight:       Admit Height:  Height: 157.5 cm (62\")    Physical Exam:  General:  no acute distresss.  Abdomen: Fundus: appropriate, firm, non tender  Extremities: normal, atraumatic, no cyanosis, and trace edema.       Lab results reviewed:  Yes   Rubella:  No results found for: \"RUBELLAIGGIN\" Nurse Transcribed from prenatal record --    Rubella Antibodies, IgG   Date Value Ref Range Status   06/12/2024 1.89 Immune >0.99 index Final     Comment:                                     Non-immune       <0.90                                  Equivocal  0.90 - 0.99                                  Immune           >0.99       Rh Status:    RH type   Date Value Ref Range Status   01/02/2025 Positive  Final     Rh Factor   Date Value Ref Range Status   06/12/2024 Positive  Final     Comment:     Please note: Prior records for this patient's ABO / Rh type are not  available for additional verification.       Immunizations:   Immunization History   Administered Date(s) Administered    ABRYSVO (RSV, 60+ or pregnant women 32-36 wks) 11/18/2024    COVID-19 (PFIZER) Purple Cap Monovalent 12/22/2020, 12/22/2020, 01/12/2021, 01/12/2021    DT 06/21/2002    DTaP 5 01/11/2002, 04/05/2002, 01/30/2003, 02/02/2006    Flu Vaccine Quad PF >36MO 10/05/2018    FluMist 2-49yrs (Nasal) 10/12/2011    " Fluzone  >6mos 10/26/2017, 10/07/2024    Fluzone (or Fluarix & Flulaval for VFC) >6mos 10/05/2018, 2021    Fluzone Quad >6mos (Multi-dose) 10/26/2017    HPV Quadrivalent 2015    Hep A, 2 Dose 10/16/2018    Hep B / HiB 2002, 2003    Hep B, Adolescent or Pediatric 2001    Hib (PRP-OMP) 2002    Hpv9 2015, 2016, 09/15/2022    IPV 2002, 2002, 2003, 2006    MCV4 Unspecified 2015    MMR 2003, 2006    Meningococcal Conjugate 2018    Meningococcal MCV4P (Menactra) 2015, 2015    Tdap 2015, 2018, 2022, 10/07/2024    Varicella 2003, 2015       Assessment & Plan   Assessment & Plan     (normal spontaneous vaginal delivery): 25, male, circ    Family history of Down syndrome- maternal cousin    Elevated hemoglobin A1c    Maternal anemia in pregnancy, antepartum      Emarie KRISHNA Tafoya is Day 1  post-partum  Vaginal, Spontaneous  .      Plan:  1) Postpartum day #1- S/P . Rh +. Hgb 10.2g/dL.    2) Postpartum care-advance.     3) Postpartum anemia- Continue iron     4) Male infant- Bottle.     5) Dispo- Plan home tomorrow       Ashlee Jones, APRN  1/3/2025  08:45 EST

## 2025-01-03 NOTE — PLAN OF CARE
Goal Outcome Evaluation:  Plan of Care Reviewed With: patient        Progress: improving  Outcome Evaluation: VSS, bonding well with baby, breastfeeding well, pain controlled

## 2025-01-03 NOTE — PLAN OF CARE
Problem: Adult Inpatient Plan of Care  Goal: Plan of Care Review  Outcome: Progressing  Flowsheets (Taken 1/3/2025 1646)  Progress: improving  Outcome Evaluation: VSS.fundus wdl. lochia wdl. pt reports pain controlled with prn tylenol. pt ambulating and voiding. pt bottle feeding and bonding well with baby  Plan of Care Reviewed With: patient  Goal: Patient-Specific Goal (Individualized)  Outcome: Progressing  Flowsheets (Taken 1/3/2025 1646)  Patient/Family-Specific Goals (Include Timeframe): feeding baby every 3-4 hours  Goal: Absence of Hospital-Acquired Illness or Injury  Outcome: Progressing  Intervention: Identify and Manage Fall Risk  Recent Flowsheet Documentation  Taken 1/3/2025 1536 by Alivia Olvera RN  Safety Promotion/Fall Prevention: safety round/check completed  Taken 1/3/2025 1400 by Alivia Olvera RN  Safety Promotion/Fall Prevention: safety round/check completed  Taken 1/3/2025 1230 by Alivia Olvera RN  Safety Promotion/Fall Prevention: safety round/check completed  Taken 1/3/2025 1000 by Alivia Olvera RN  Safety Promotion/Fall Prevention: safety round/check completed  Taken 1/3/2025 0816 by Alivia Olvera RN  Safety Promotion/Fall Prevention: safety round/check completed  Goal: Optimal Comfort and Wellbeing  Outcome: Progressing  Intervention: Monitor Pain and Promote Comfort  Recent Flowsheet Documentation  Taken 1/3/2025 1536 by Alivia Olvera RN  Pain Management Interventions: pain medication given  Taken 1/3/2025 0816 by Alivia Olvera RN  Pain Management Interventions: pain medication given  Intervention: Provide Person-Centered Care  Recent Flowsheet Documentation  Taken 1/3/2025 0816 by Alivia Olvera RN  Trust Relationship/Rapport:   care explained   choices provided   emotional support provided   empathic listening provided   questions answered   questions encouraged   reassurance provided   thoughts/feelings acknowledged  Goal: Readiness for  Transition of Care  Outcome: Progressing     Problem: Postpartum (Vaginal Delivery)  Goal: Successful Parent Role Transition  Outcome: Progressing  Goal: Hemostasis  Outcome: Progressing  Goal: Absence of Infection Signs and Symptoms  Outcome: Progressing  Goal: Anesthesia/Sedation Recovery  Outcome: Progressing  Intervention: Optimize Anesthesia Recovery  Recent Flowsheet Documentation  Taken 1/3/2025 1536 by Alivia Olvera RN  Safety Promotion/Fall Prevention: safety round/check completed  Taken 1/3/2025 1400 by Alivia Olvera RN  Safety Promotion/Fall Prevention: safety round/check completed  Taken 1/3/2025 1230 by Alivia Olvera RN  Safety Promotion/Fall Prevention: safety round/check completed  Taken 1/3/2025 1000 by Alivia Olvera RN  Safety Promotion/Fall Prevention: safety round/check completed  Taken 1/3/2025 0816 by Alivia Olvera RN  Safety Promotion/Fall Prevention: safety round/check completed  Goal: Optimal Pain Control and Function  Outcome: Progressing  Intervention: Prevent or Manage Pain  Recent Flowsheet Documentation  Taken 1/3/2025 1536 by Alivia Olvera RN  Pain Management Interventions: pain medication given  Taken 1/3/2025 0816 by Alivia Olvera RN  Pain Management Interventions: pain medication given  Goal: Effective Urinary Elimination  Outcome: Progressing   Goal Outcome Evaluation:  Plan of Care Reviewed With: patient        Progress: improving  Outcome Evaluation: VSS.fundus wdl. lochia wdl. pt reports pain controlled with prn tylenol. pt ambulating and voiding. pt bottle feeding and bonding well with baby

## 2025-01-04 VITALS
SYSTOLIC BLOOD PRESSURE: 116 MMHG | RESPIRATION RATE: 18 BRPM | TEMPERATURE: 97.8 F | HEIGHT: 62 IN | BODY MASS INDEX: 28.89 KG/M2 | DIASTOLIC BLOOD PRESSURE: 56 MMHG | WEIGHT: 157 LBS | OXYGEN SATURATION: 96 % | HEART RATE: 75 BPM

## 2025-01-04 PROCEDURE — 0503F POSTPARTUM CARE VISIT: CPT | Performed by: OBSTETRICS & GYNECOLOGY

## 2025-01-04 RX ORDER — ACETAMINOPHEN 325 MG/1
650 TABLET ORAL EVERY 6 HOURS PRN
Start: 2025-01-04

## 2025-01-04 RX ORDER — PSEUDOEPHEDRINE HCL 30 MG
100 TABLET ORAL 2 TIMES DAILY
Qty: 20 CAPSULE | Refills: 0 | Status: SHIPPED | OUTPATIENT
Start: 2025-01-04

## 2025-01-04 RX ORDER — IBUPROFEN 600 MG/1
600 TABLET, FILM COATED ORAL EVERY 6 HOURS PRN
Qty: 30 TABLET | Refills: 0 | Status: SHIPPED | OUTPATIENT
Start: 2025-01-04

## 2025-01-04 RX ADMIN — ACETAMINOPHEN 650 MG: 325 TABLET ORAL at 00:24

## 2025-01-04 RX ADMIN — DOCUSATE SODIUM 100 MG: 100 CAPSULE, LIQUID FILLED ORAL at 08:21

## 2025-01-04 RX ADMIN — IBUPROFEN 600 MG: 600 TABLET, FILM COATED ORAL at 02:54

## 2025-01-04 RX ADMIN — PRENATAL VIT W/ FE FUMARATE-FA TAB 27-0.8 MG 1 TABLET: 27-0.8 TAB at 08:21

## 2025-01-04 NOTE — DISCHARGE SUMMARY
"Obstetrical Discharge Form    Primary OB Clinician: MIGUEL      Gestational Age: 39w3d    Antepartum complications: Anemia in pregnancy, subchorionic hemorrhage in the first trimester which resolved    Date of Delivery:  1/2/2025    Delivered By: Henrry Claros    Delivery Type: spontaneous vaginal delivery    Tubal Ligation: n/a    Baby: Liveborn male, Apgars 9/9, weight 8 #, 7 oz    Anesthesia: epidural    Intrapartum complications: None    Laceration: none      Feeding method: bottle - Similac Advance      Discharge Date: 1/4/2025; Discharge Time: 10:23 EST    /56 (BP Location: Left arm, Patient Position: Sitting)   Pulse 75   Temp 97.8 °F (36.6 °C) (Oral)   Resp 18   Ht 157.5 cm (62\")   Wt 71.2 kg (157 lb)   LMP 04/01/2024   SpO2 96%   Breastfeeding Yes   BMI 28.72 kg/m²      Abd: Soft, fundus firm, nontender  Ext: No cords  Results from last 7 days   Lab Units 01/03/25  0531   HEMOGLOBIN g/dL 10.2*       IMP/DDX: Postpartum day 2 status post spontaneous vaginal delivery, maternal anemia antepartum and postpartum      Plan:  First male fetus to this multiparous patient.  Desires circumcision.  Risk benefits alternatives discussed.  Procedure described in detail.  Postop expectations discussed as well as how to care for the infant.  Warning signs given.  Desires circumcision for male infant.  Patient given written instruction sheet.  Follow-up appointment with Dr. Claros in 6 weeks.    Xavier Jean MD  10:23 EST  1/4/2025  "

## 2025-01-04 NOTE — NURSING NOTE
Discharge instructions went over with pt. Pt verbalizes understanding and importance of follow up care. Pt discharged home ambulatory accompanied by partner and infant.

## 2025-02-18 ENCOUNTER — POSTPARTUM VISIT (OUTPATIENT)
Dept: OBSTETRICS AND GYNECOLOGY | Facility: CLINIC | Age: 25
End: 2025-02-18
Payer: MEDICAID

## 2025-02-18 VITALS
WEIGHT: 135 LBS | DIASTOLIC BLOOD PRESSURE: 60 MMHG | HEIGHT: 62 IN | SYSTOLIC BLOOD PRESSURE: 100 MMHG | BODY MASS INDEX: 24.84 KG/M2

## 2025-02-18 DIAGNOSIS — Z13.89 SCREENING FOR GENITOURINARY CONDITION: ICD-10-CM

## 2025-02-18 DIAGNOSIS — Z30.42 DEPO-PROVERA CONTRACEPTIVE STATUS: Primary | ICD-10-CM

## 2025-02-18 LAB
B-HCG UR QL: NEGATIVE
BILIRUB BLD-MCNC: NEGATIVE MG/DL
CLARITY, POC: CLEAR
COLOR UR: YELLOW
EXPIRATION DATE: NORMAL
GLUCOSE UR STRIP-MCNC: NEGATIVE MG/DL
INTERNAL NEGATIVE CONTROL: NORMAL
INTERNAL POSITIVE CONTROL: NORMAL
KETONES UR QL: NEGATIVE
LEUKOCYTE EST, POC: NEGATIVE
Lab: NORMAL
NITRITE UR-MCNC: NEGATIVE MG/ML
PH UR: 5 [PH] (ref 5–8)
PROT UR STRIP-MCNC: NEGATIVE MG/DL
RBC # UR STRIP: ABNORMAL /UL
SP GR UR: 1 (ref 1–1.03)
UROBILINOGEN UR QL: NORMAL

## 2025-02-18 RX ORDER — MEDROXYPROGESTERONE ACETATE 150 MG/ML
150 INJECTION, SUSPENSION INTRAMUSCULAR ONCE
Status: COMPLETED | OUTPATIENT
Start: 2025-02-18 | End: 2025-02-18

## 2025-02-18 RX ORDER — MEDROXYPROGESTERONE ACETATE 150 MG/ML
150 INJECTION, SUSPENSION INTRAMUSCULAR
Qty: 1 ML | Refills: 3 | Status: SHIPPED | OUTPATIENT
Start: 2025-02-18

## 2025-02-18 RX ADMIN — MEDROXYPROGESTERONE ACETATE 150 MG: 150 INJECTION, SUSPENSION INTRAMUSCULAR at 15:12

## 2025-02-18 NOTE — PROGRESS NOTES
"FINAL POSTPARTUM VISIT: Vaginal, Spontaneous; gender: male    S:  doing well.  PP depression symptoms or concerns?  no.  [place] Score: 7 today.         Postpartum Depression: Medium Risk (8/10/2022)    [place]  Depression Scale     Last EPDS Total Score: 5     Last EPDS Self Harm Result: Not on file       Breast or bottle feeding?  bottle.      Hx GDM? no, Hx HTN? no.  If \"yes\" to either, pt counseled that these disorders are associated   With a higher lifetime risk of cardiometabolic disease and that follow up with their PCP for ongoing   Care is vital.     Any shortness of breath or persistent lower extremity swelling?  no    Hx  birth? no    O:  /60   Ht 157.5 cm (62\")   Wt 61.2 kg (135 lb)   LMP 2024   Breastfeeding No   BMI 24.69 kg/m²     Exam: def    A:  doing well, desires depo provera for contraception      * No active hospital problems. *      Smoker? no    P:  RTO 1yr    Contraception needed?  Depo erxd    Pt instructed to incorporate pelvic floor exercises for VERONIQUE.    Henrry Claros MD  14:29 EST  @today@  "

## 2025-05-13 ENCOUNTER — CLINICAL SUPPORT (OUTPATIENT)
Dept: OBSTETRICS AND GYNECOLOGY | Facility: CLINIC | Age: 25
End: 2025-05-13
Payer: MEDICAID

## 2025-05-13 VITALS — WEIGHT: 131 LBS | BODY MASS INDEX: 24.11 KG/M2 | HEIGHT: 62 IN

## 2025-05-13 DIAGNOSIS — Z30.42 DEPO-PROVERA CONTRACEPTIVE STATUS: Primary | ICD-10-CM

## 2025-05-13 RX ORDER — MEDROXYPROGESTERONE ACETATE 150 MG/ML
150 INJECTION, SUSPENSION INTRAMUSCULAR ONCE
Status: COMPLETED | OUTPATIENT
Start: 2025-05-13 | End: 2025-05-13

## 2025-05-13 RX ORDER — MEDROXYPROGESTERONE ACETATE 150 MG/ML
INJECTION, SUSPENSION INTRAMUSCULAR
COMMUNITY
Start: 2025-05-13

## 2025-05-13 RX ADMIN — MEDROXYPROGESTERONE ACETATE 150 MG: 150 INJECTION, SUSPENSION INTRAMUSCULAR at 11:47

## 2025-07-29 ENCOUNTER — CLINICAL SUPPORT (OUTPATIENT)
Dept: OBSTETRICS AND GYNECOLOGY | Facility: CLINIC | Age: 25
End: 2025-07-29
Payer: MEDICAID

## 2025-07-29 VITALS — BODY MASS INDEX: 24.87 KG/M2 | WEIGHT: 136 LBS

## 2025-07-29 DIAGNOSIS — Z30.42 DEPO-PROVERA CONTRACEPTIVE STATUS: Primary | ICD-10-CM

## 2025-07-29 RX ORDER — MEDROXYPROGESTERONE ACETATE 150 MG/ML
150 INJECTION, SUSPENSION INTRAMUSCULAR ONCE
Status: COMPLETED | OUTPATIENT
Start: 2025-07-29 | End: 2025-07-29

## 2025-07-29 RX ADMIN — MEDROXYPROGESTERONE ACETATE 150 MG: 150 INJECTION, SUSPENSION INTRAMUSCULAR at 11:32
